# Patient Record
Sex: FEMALE | Race: BLACK OR AFRICAN AMERICAN | NOT HISPANIC OR LATINO | Employment: FULL TIME | URBAN - METROPOLITAN AREA
[De-identification: names, ages, dates, MRNs, and addresses within clinical notes are randomized per-mention and may not be internally consistent; named-entity substitution may affect disease eponyms.]

---

## 2018-04-06 LAB
HBV SURFACE AG SERPL QL IA: NEGATIVE
RUBELLA ANTIBODY IGG QUANTITATIVE: NORMAL IU/ML

## 2018-04-27 ENCOUNTER — HOSPITAL ENCOUNTER (OUTPATIENT)
Facility: CLINIC | Age: 33
End: 2018-04-27
Payer: COMMERCIAL

## 2018-07-18 LAB — HIV 1+2 AB+HIV1 P24 AG SERPL QL IA: NEGATIVE

## 2018-10-03 ENCOUNTER — HEALTH MAINTENANCE LETTER (OUTPATIENT)
Age: 33
End: 2018-10-03

## 2018-10-12 ENCOUNTER — HOSPITAL ENCOUNTER (INPATIENT)
Facility: CLINIC | Age: 33
LOS: 4 days | Discharge: HOME | End: 2018-10-16
Attending: FAMILY MEDICINE | Admitting: FAMILY MEDICINE
Payer: COMMERCIAL

## 2018-10-12 DIAGNOSIS — Z98.891 S/P CESAREAN SECTION: Primary | ICD-10-CM

## 2018-10-12 PROBLEM — O42.90 PREMATURE RUPTURE OF MEMBRANES: Status: ACTIVE | Noted: 2018-10-12

## 2018-10-12 PROBLEM — Z34.00 SUPERVISION OF NORMAL FIRST PREGNANCY: Status: ACTIVE | Noted: 2018-04-06

## 2018-10-12 PROBLEM — Z86.19 HISTORY OF HERPES GENITALIS: Status: ACTIVE | Noted: 2018-04-06

## 2018-10-12 PROBLEM — E55.9 VITAMIN D DEFICIENCY: Status: ACTIVE | Noted: 2018-04-12

## 2018-10-12 PROBLEM — O99.013 ANEMIA DURING PREGNANCY IN THIRD TRIMESTER: Status: ACTIVE | Noted: 2018-08-29

## 2018-10-12 LAB
HGB BLD-MCNC: 10.7 G/DL (ref 11.7–15.7)
PLATELET # BLD AUTO: 221 10E9/L (ref 150–450)
RUPTURE OF FETAL MEMBRANES BY ROM PLUS: POSITIVE

## 2018-10-12 PROCEDURE — 86900 BLOOD TYPING SEROLOGIC ABO: CPT | Performed by: FAMILY MEDICINE

## 2018-10-12 PROCEDURE — 86850 RBC ANTIBODY SCREEN: CPT | Performed by: MIDWIFE

## 2018-10-12 PROCEDURE — 40000268 ZZH STATISTIC NO CHARGES

## 2018-10-12 PROCEDURE — 86901 BLOOD TYPING SEROLOGIC RH(D): CPT | Performed by: FAMILY MEDICINE

## 2018-10-12 PROCEDURE — 86780 TREPONEMA PALLIDUM: CPT | Performed by: FAMILY MEDICINE

## 2018-10-12 PROCEDURE — 85018 HEMOGLOBIN: CPT | Performed by: FAMILY MEDICINE

## 2018-10-12 PROCEDURE — G0463 HOSPITAL OUTPT CLINIC VISIT: HCPCS

## 2018-10-12 PROCEDURE — 86901 BLOOD TYPING SEROLOGIC RH(D): CPT | Performed by: MIDWIFE

## 2018-10-12 PROCEDURE — 25000132 ZZH RX MED GY IP 250 OP 250 PS 637: Performed by: FAMILY MEDICINE

## 2018-10-12 PROCEDURE — 12000030 ZZH R&B OB INTERMEDIATE UMMC

## 2018-10-12 PROCEDURE — 86900 BLOOD TYPING SEROLOGIC ABO: CPT | Performed by: MIDWIFE

## 2018-10-12 PROCEDURE — 36415 COLL VENOUS BLD VENIPUNCTURE: CPT | Performed by: FAMILY MEDICINE

## 2018-10-12 PROCEDURE — 84112 EVAL AMNIOTIC FLUID PROTEIN: CPT | Performed by: FAMILY MEDICINE

## 2018-10-12 PROCEDURE — 85049 AUTOMATED PLATELET COUNT: CPT | Performed by: FAMILY MEDICINE

## 2018-10-12 RX ORDER — AMOXICILLIN 250 MG
1 CAPSULE ORAL DAILY
Status: ON HOLD | COMMUNITY
End: 2018-10-12

## 2018-10-12 RX ORDER — PRENATAL VIT/IRON FUM/FOLIC AC 27MG-0.8MG
1 TABLET ORAL DAILY
COMMUNITY

## 2018-10-12 RX ORDER — ACETAMINOPHEN 325 MG/1
650 TABLET ORAL EVERY 4 HOURS PRN
Status: DISCONTINUED | OUTPATIENT
Start: 2018-10-12 | End: 2018-10-13

## 2018-10-12 RX ORDER — METHYLERGONOVINE MALEATE 0.2 MG/ML
200 INJECTION INTRAVENOUS
Status: DISCONTINUED | OUTPATIENT
Start: 2018-10-12 | End: 2018-10-13

## 2018-10-12 RX ORDER — IBUPROFEN 800 MG/1
800 TABLET, FILM COATED ORAL
Status: DISCONTINUED | OUTPATIENT
Start: 2018-10-12 | End: 2018-10-13

## 2018-10-12 RX ORDER — NALOXONE HYDROCHLORIDE 0.4 MG/ML
.1-.4 INJECTION, SOLUTION INTRAMUSCULAR; INTRAVENOUS; SUBCUTANEOUS
Status: DISCONTINUED | OUTPATIENT
Start: 2018-10-12 | End: 2018-10-13

## 2018-10-12 RX ORDER — MORPHINE SULFATE 10 MG/ML
10 INJECTION, SOLUTION INTRAMUSCULAR; INTRAVENOUS
Status: COMPLETED | OUTPATIENT
Start: 2018-10-12 | End: 2018-10-13

## 2018-10-12 RX ORDER — ONDANSETRON 2 MG/ML
4 INJECTION INTRAMUSCULAR; INTRAVENOUS EVERY 6 HOURS PRN
Status: DISCONTINUED | OUTPATIENT
Start: 2018-10-12 | End: 2018-10-12

## 2018-10-12 RX ORDER — OXYCODONE AND ACETAMINOPHEN 5; 325 MG/1; MG/1
1 TABLET ORAL
Status: DISCONTINUED | OUTPATIENT
Start: 2018-10-12 | End: 2018-10-13

## 2018-10-12 RX ORDER — OXYTOCIN/0.9 % SODIUM CHLORIDE 30/500 ML
100-340 PLASTIC BAG, INJECTION (ML) INTRAVENOUS CONTINUOUS PRN
Status: COMPLETED | OUTPATIENT
Start: 2018-10-12 | End: 2018-10-13

## 2018-10-12 RX ORDER — OXYTOCIN 10 [USP'U]/ML
10 INJECTION, SOLUTION INTRAMUSCULAR; INTRAVENOUS
Status: DISCONTINUED | OUTPATIENT
Start: 2018-10-12 | End: 2018-10-13

## 2018-10-12 RX ORDER — MISOPROSTOL 100 UG/1
25 TABLET ORAL
Status: DISCONTINUED | OUTPATIENT
Start: 2018-10-12 | End: 2018-10-13

## 2018-10-12 RX ORDER — CARBOPROST TROMETHAMINE 250 UG/ML
250 INJECTION, SOLUTION INTRAMUSCULAR
Status: DISCONTINUED | OUTPATIENT
Start: 2018-10-12 | End: 2018-10-13

## 2018-10-12 RX ORDER — SODIUM CHLORIDE, SODIUM LACTATE, POTASSIUM CHLORIDE, CALCIUM CHLORIDE 600; 310; 30; 20 MG/100ML; MG/100ML; MG/100ML; MG/100ML
INJECTION, SOLUTION INTRAVENOUS CONTINUOUS
Status: DISCONTINUED | OUTPATIENT
Start: 2018-10-12 | End: 2018-10-13

## 2018-10-12 RX ORDER — FENTANYL CITRATE 50 UG/ML
50-100 INJECTION, SOLUTION INTRAMUSCULAR; INTRAVENOUS
Status: DISCONTINUED | OUTPATIENT
Start: 2018-10-12 | End: 2018-10-13

## 2018-10-12 RX ORDER — HYDROXYZINE HYDROCHLORIDE 50 MG/1
100 TABLET, FILM COATED ORAL
Status: COMPLETED | OUTPATIENT
Start: 2018-10-12 | End: 2018-10-13

## 2018-10-12 RX ORDER — LIDOCAINE 40 MG/G
CREAM TOPICAL
Status: DISCONTINUED | OUTPATIENT
Start: 2018-10-12 | End: 2018-10-13

## 2018-10-12 RX ORDER — ONDANSETRON 2 MG/ML
4 INJECTION INTRAMUSCULAR; INTRAVENOUS EVERY 6 HOURS PRN
Status: DISCONTINUED | OUTPATIENT
Start: 2018-10-12 | End: 2018-10-13

## 2018-10-12 RX ADMIN — Medication 25 MCG: at 22:56

## 2018-10-12 ASSESSMENT — ACTIVITIES OF DAILY LIVING (ADL)
AMBULATION: 0-->INDEPENDENT
RETIRED_EATING: 0-->INDEPENDENT
COGNITION: 0 - NO COGNITION ISSUES REPORTED
FALL_HISTORY_WITHIN_LAST_SIX_MONTHS: NO
TOILETING: 0-->INDEPENDENT
SWALLOWING: 0-->SWALLOWS FOODS/LIQUIDS WITHOUT DIFFICULTY
RETIRED_COMMUNICATION: 0-->UNDERSTANDS/COMMUNICATES WITHOUT DIFFICULTY
BATHING: 0-->INDEPENDENT
DRESS: 0-->INDEPENDENT
TRANSFERRING: 0-->INDEPENDENT

## 2018-10-12 NOTE — IP AVS SNAPSHOT
[facility]    [address] [address]    Phone:  [phone]                                       After Visit Summary   10/12/2018    Maya Pedro    MRN: [id number]           After Visit Summary Signature Page     I have received my discharge instructions, and my questions have been answered. I have discussed any challenges I see with this plan with the nurse or doctor.    ..........................................................................................................................................  Patient/Patient Representative Signature      ..........................................................................................................................................  Patient Representative Print Name and Relationship to Patient    ..................................................               ................................................  Date                                   Time    ..........................................................................................................................................  Reviewed by Signature/Title    ...................................................              ..............................................  Date                                               Time          22EPIC Rev 08/18

## 2018-10-12 NOTE — IP AVS SNAPSHOT
MRN:[id number]                      After Visit Summary   10/12/2018    Maya Pedro    MRN: [id number]           Thank you!     Thank you for choosing [facility] for your care. Our goal is always to provide you with excellent care. Hearing back from our patients is one way we can continue to improve our services. Please take a few minutes to complete the written survey that you may receive in the mail after you visit with us. Thank you!        Patient Information     Date Of Birth          1985        Designated Caregiver       Most Recent Value    Caregiver    Will someone help with your care after discharge? no    Name of designated caregiver Sergei      About your hospital stay     You were admitted on:  2018 You last received care in the:  [facility]    You were discharged on:  2018        Reason for your hospital stay       Maternity care                  Who to Call     For medical emergencies, please call 911.  For non-urgent questions about your medical care, please call your primary care provider or clinic, [phone]  For questions related to your surgery, please call your surgery clinic        Attending Provider     Provider Specialty    Subha Graham MD Family Practice    Patricia Hart [facility]N [facility]       Primary Care Provider Office Phone # Fax #    Sebastián SABILLON ANGELA Haider [phone]      After Care Instructions     Activity       Review discharge instructions            Diet       Resume previous diet            Discharge Instructions - Postpartum visit       Schedule postpartum visit with your provider and return to clinic in 1 week for BP check and in 6 weeks.    Postpartum Instructions    Post  Activity    Ask family and friends for help when you need it.  Do not place anything in your vagina for 6 weeks.  No intercourse for 6 weeks.   No lifting >15 lbs for 6 weeks  No  strenuous exercise for 6 weeks.   No driving until you have stopped taking your pain medications (usually two weeks after delivery).      Call your health care provider if you have any of these symptoms:    Increased pain, swelling, redness, or fluid around your stiches from an episiotomy or perineal tear.  A fever above 100.4 F (38 C) with or without chills when placing a thermometer under your tongue.  You soak a sanitary pad with blood within 1 hour, or you see blood clots larger than a golf ball.  Bleeding that lasts more than 6 weeks.  Vaginal discharge that smells bad.  Severe pain, cramping or tenderness in your lower belly area.  A need to urinate more frequently (use the toilet more often), more urgently (use the toilet very quickly), or it burns when you urinate.  Severe headaches or visual changes   Nausea and vomiting.  Redness, swelling or pain around a vein in your leg.  Problems breastfeeding or a red or painful area on your breast.  Chest pain and cough or are gasping for air.  Problems coping with sadness, anxiety, or depression.  If you have any concerns about hurting yourself or the baby, call your provider immediately.   You have questions or concerns after you return home.    Keep your hands clean:  Always wash your hands before touching your perineal area and stitches.  This helps reduce your risk of infection. Keep your nails clean and short.      Follow up in 1 week for BP check and in 6 weeks for post partum visit                  Follow-up Appointments     Follow Up and recommended labs and tests       Follow up with OB provider in 1 week for a BP check and 6 weeks for postpartum visit.                  Further instructions from your care team       Postop  Birth Instructions    Activity       Do not lift more than 10 pounds for 6 weeks after surgery.  Ask family and friends for help when you need it.    No driving until you have stopped taking your pain medications (usually two  weeks after surgery).    No heavy exercise or activity for 6 weeks.  Don't do anything that will put a strain on your surgery site.    Don't strain when using the toilet.  Your care team may prescribe a stool softener if you have problems with your bowel movements.     To care for your incision:       Keep the incision clean and dry.    Do not soak your incision in water. No swimming or hot tubs until it has fully healed. You may soak in the bathtub if the water level is below your incision.    Do not use peroxide, gel, cream, lotion, or ointment on your incision.    Adjust your clothes to avoid pressure on your surgery site (check the elastic in your underwear for example).     You may see a small amount of clear or pink drainage and this is normal.  Check with your health care provider:       If the drainage increases or has an odor.    If the incision reddens, you have swelling, or develop a rash.    If you have increased pain and the medicine we prescribed doesn't help.    If you have a fever above 100.4 F (38 C) with or without chills when placing thermometer under your tongue.   The area around your incision (surgery wound), will feel numb.  This is normal. The numbness should go away in less than a year.     Keep your hands clean:  Always wash your hands before touching your incision (surgery wound). This helps reduce your risk of infection. If your hands aren't dirty, you may use an alcohol hand-rub to clean your hands. Keep your nails clean and short.    Call your healthcare provider if you have any of these symptoms:       You soak a sanitary pad with blood within 1 hour, or you see blood clots larger than a golf ball.    Bleeding that lasts more than 6 weeks.    Vaginal discharge that smells bad.    Severe pain, cramping or tenderness in your lower belly area.    A need to urinate more frequently (use the toilet more often), more urgently (use the toilet very quickly), or it burns when you  "urinate.    Nausea and vomiting.    Redness, swelling or pain around a vein in your leg.    Problems breastfeeding or a red or painful area on your breast.    Chest pain and cough or are gasping for air.    Problems with coping with sadness, anxiety or depression. If you have concerns about hurting yourself or the baby, call your provider immediately.      You have questions or concerns after you return home.     Preeclampsia   Call your doctor right away if you have any of the following:  - Edema (swelling) in your face or hands  - Rapid weight gain-about 1 pound or more in a day  - Headache  - Abdominal pain on your right side  - Vision problems (flashes or spots)  - You have questions or concerns once you return home.               Pending Results     Date and Time Order Name Status Description    10/13/2018 2239 Placenta path order and indications In process             Admission Information     Date & Time Provider Department Dept. Phone    10/12/2018 Yolanda Lozano MD [facility] [phone]      Your Vitals Were     Blood Pressure Pulse Temperature Respirations Pulse Oximetry       124/80 71 98.2  F (36.8  C) (Oral) 16 100%       MyChart Information     [facility] lets you send messages to your doctor, view your test results, renew your prescriptions, schedule appointments and more. To sign up, go to [email or web address] . Click on \"Log in\" on the left side of the screen, which will take you to the Welcome page. Then click on \"Sign up Now\" on the right side of the page.     You will be asked to enter the access code listed below, as well as some personal information. Please follow the directions to create your username and password.     Your access code is: [email or web address]  Expires: 2019  1:54 PM     Your access code will  in 90 days. If you need help or a new code, please call your [facility] clinic or [phone].        Care EveryWhere ID     This is your Care EveryWhere ID. This could be used " by other organizations to access your [place] medical records  [id number]        Equal Access to Services     CJ MCCARTY : Diana Arcos, thor bruner, kell contrerasmathomas chaparro, jennifer dexter. So [facility] [phone].    ATENCIÓN: Si habla español, tiene a figueroa disposición servicios gratuitos de asistencia lingüística. Maiaame al [phone].    We comply with applicable federal civil rights laws and [place] laws. We do not discriminate on the basis of race, color, national origin, age, disability, sex, sexual orientation, or gender identity.               Review of your medicines      START taking        Dose / Directions    ferrous gluconate 324 (38 Fe) MG tablet   Commonly known as:  FERGON        Dose:  324 mg   Take 1 tablet (324 mg) by mouth daily (with breakfast)   Quantity:  60 tablet   Refills:  0       ibuprofen 800 MG tablet   Commonly known as:  ADVIL/MOTRIN        Dose:  800 mg   Take 1 tablet (800 mg) by mouth every 6 hours as needed for other (cramping)   Quantity:  40 tablet   Refills:  0       oxyCODONE IR 5 MG tablet   Commonly known as:  ROXICODONE        Dose:  5 mg   Take 1 tablet (5 mg) by mouth every 6 hours as needed for severe pain   Quantity:  10 tablet   Refills:  0       senna-docusate 8.6-50 MG per tablet   Commonly known as:  SENOKOT-S;PERICOLACE        Dose:  1 tablet   Take 1 tablet by mouth 2 times daily as needed for constipation   Quantity:  40 tablet   Refills:  0         CONTINUE these medicines which have NOT CHANGED        Dose / Directions    ACYCLOVIR PO        Dose:  1 g   Take 1 g by mouth daily   Refills:  0       COLACE PO        Dose:  100 mg   Take 100 mg by mouth   Refills:  0       IRON SUPPLEMENT PO        Dose:  325 mg   Take 325 mg by mouth daily   Refills:  0       prenatal multivitamin plus iron 27-0.8 MG Tabs per tablet   Indication:  Pregnancy        Dose:  1 tablet   Take 1 tablet by mouth daily   Refills:  0     "   VITAMIN D (CHOLECALCIFEROL) PO        Take by mouth daily   Refills:  0            Where to get your medicines      These medications were sent to [facility] [place], [place] - [address]  [address] [facility] [address], [facility] [address]     Phone:  [phone]     ferrous gluconate 324 (38 Fe) MG tablet    ibuprofen 800 MG tablet    senna-docusate 8.6-50 MG per tablet         Some of these will need a paper prescription and others can be bought over the counter. Ask your nurse if you have questions.     Bring a paper prescription for each of these medications     oxyCODONE IR 5 MG tablet                Protect others around you: Learn how to safely use, store and throw away your medicines at www.disposemymeds.org.        Information about your nerve block     Today you received a block to numb the nerves near your surgery site.    This is a block using local anesthetic or \"numbing\" medication injected around the nerves to anesthetize or \"numb\" the area supplied by those nerves. This block is injected into the muscle layer near your surgical site. The type of anesthesia (Exparel) your anesthesia team used to numb your abdomen may give you relief for up to 72 hours.     Diet: There are no diet restrictions, but you should drink plenty of fluids, unless you are on a fluid-restricted diet.     Activity: If your surgical site is an arm or leg you should be careful with your affected limb, since it is possible to injure your limb without being aware of it due to the numbing. Until full feeling returns, you should guard against bumping or hitting your limb, and avoid extreme hot or cold temperatures on the skin.    Pain Medication: As the block wears off, the feeling will return as a tingling or prickly sensation near your surgical site. You will experience more discomfort from your incisions as the feeling returns. You may want to take a pain pill (a narcotic or Tylenol if this was prescribed by " your surgeon) when you start to experience mild pain, before the pain becomes more severe. If your pain medications do not control your pain, you should notify your surgeon. If you are taking narcotics for pain management, do not drink alcohol, drive a car, or perform hazardous activities.  If you have questions or concerns you may call your surgeon at the number provided with your discharge instructions.     Call your surgeon if you experience blurry vision, ringing in the ears or metallic taste in your mouth.         ANTIBIOTIC INSTRUCTION     You've Been Prescribed an Antibiotic - Now What?  Your healthcare team thinks that you or your loved one might have an infection. Some infections can be treated with antibiotics, which are powerful, life-saving drugs. Like all medications, antibiotics have side effects and should only be used when necessary. There are some important things you should know about your antibiotic treatment.      Your healthcare team may run tests before you start taking an antibiotic.    Your team may take samples (e.g., from your blood, urine or other areas) to run tests to look for bacteria. These test can be important to determine if you need an antibiotic at all and, if you do, which antibiotic will work best.      Within a few days, your healthcare team might change or even stop your antibiotic.    Your team may start you on an antibiotic while they are working to find out what is making you sick.    Your team might change your antibiotic because test results show that a different antibiotic would be better to treat your infection.    In some cases, once your team has more information, they learn that you do not need an antibiotic at all. They may find out that you don't have an infection, or that the antibiotic you're taking won't work against your infection. For example, an infection caused by a virus can't be treated with antibiotics. Staying on an antibiotic when you don't need it is  more likely to be harmful than helpful.      You may experience side effects from your antibiotic.    Like all medications, antibiotics have side effects. Some of these can be serious.    Let you healthcare team know if you have any known allergies when you are admitted to the hospital.    One significant side effect of nearly all antibiotics is the risk of severe and sometimes deadly diarrhea caused by Clostridium difficile (C. Difficile). This occurs when a person takes antibiotics because some good germs are destroyed. Antibiotic use allows C. diificile to take over, putting patients at high risk for this serious infection.    As a patient or caregiver, it is important to understand your or your loved one's antibiotic treatment. It is especially important for caregivers to speak up when patients can't speak for themselves. Here are some important questions to ask your healthcare team.    What infection is this antibiotic treating and how do you know I have that infection?    What side effects might occur from this antibiotic?    How long will I need to take this antibiotic?    Is it safe to take this antibiotic with other medications or supplements (e.g., vitamins) that I am taking?     Are there any special directions I need to know about taking this antibiotic? For example, should I take it with food?    How will I be monitored to know whether my infection is responding to the antibiotic?    What tests may help to make sure the right antibiotic is prescribed for me?      Information provided by:  www.cdc.gov/getsmart  U.S. Department of Health and Human Services  Centers for disease Control and Prevention  National Center for Emerging and Zoonotic Infectious Diseases  Division of Healthcare Quality Promotion        Information about OPIOIDS     PRESCRIPTION OPIOIDS: WHAT YOU NEED TO KNOW   We gave you an opioid (narcotic) pain medicine. It is important to manage your pain, but opioids are not always the best  choice. You should first try all the other options your care team gave you. Take this medicine for as short a time (and as few doses) as possible.    Some activities can increase your pain, such as bandage changes or therapy sessions. It may help to take your pain medicine 30 to 60 minutes before these activities. Reduce your stress by getting enough sleep, working on hobbies you enjoy and practicing relaxation or meditation. Talk to your care team about ways to manage your pain beyond prescription opioids.    These medicines have risks:    DO NOT drive when on new or higher doses of pain medicine. These medicines can affect your alertness and reaction times, and you could be arrested for driving under the influence (DUI). If you need to use opioids long-term, talk to your care team about driving.    DO NOT operate heavy machinery    DO NOT do any other dangerous activities while taking these medicines.    DO NOT drink any alcohol while taking these medicines.     If the opioid prescribed includes acetaminophen, DO NOT take with any other medicines that contain acetaminophen. Read all labels carefully. Look for the word  acetaminophen  or  Tylenol.  Ask your pharmacist if you have questions or are unsure.    You can get addicted to pain medicines, especially if you have a history of addiction (chemical, alcohol or substance dependence). Talk to your care team about ways to reduce this risk.    All opioids tend to cause constipation. Drink plenty of water and eat foods that have a lot of fiber, such as fruits, vegetables, prune juice, apple juice and high-fiber cereal. Take a laxative (Miralax, milk of magnesia, Colace, Senna) if you don t move your bowels at least every other day. Other side effects include upset stomach, sleepiness, dizziness, throwing up, tolerance (needing more of the medicine to have the same effect), physical dependence and slowed breathing.    Store your pills in a secure place, locked if  possible. We will not replace any lost or stolen medicine. If you don t finish your medicine, please throw away (dispose) as directed by your pharmacist. The [place] Pollution Control Agency has more information about safe disposal: https://www.pca.state.[place].us/living-green/managing-unwanted-medications             Medication List: This is a list of all your medications and when to take them. Check marks below indicate your daily home schedule. Keep this list as a reference.      Medications           Morning Afternoon Evening Bedtime As Needed    ACYCLOVIR PO   Take 1 g by mouth daily   Last time this was given:  400 mg on 10/13/2018  4:58 PM                                COLACE PO   Take 100 mg by mouth                                ferrous gluconate 324 (38 Fe) MG tablet   Commonly known as:  FERGON   Take 1 tablet (324 mg) by mouth daily (with breakfast)                                ibuprofen 800 MG tablet   Commonly known as:  ADVIL/MOTRIN   Take 1 tablet (800 mg) by mouth every 6 hours as needed for other (cramping)   Last time this was given:  800 mg on 10/16/2018 12:06 PM                                IRON SUPPLEMENT PO   Take 325 mg by mouth daily                                oxyCODONE IR 5 MG tablet   Commonly known as:  ROXICODONE   Take 1 tablet (5 mg) by mouth every 6 hours as needed for severe pain                                prenatal multivitamin plus iron 27-0.8 MG Tabs per tablet   Take 1 tablet by mouth daily                                senna-docusate 8.6-50 MG per tablet   Commonly known as:  SENOKOT-S;PERICOLACE   Take 1 tablet by mouth 2 times daily as needed for constipation   Last time this was given:  1 tablet on 10/15/2018  7:20 PM                                VITAMIN D (CHOLECALCIFEROL) PO   Take by mouth daily

## 2018-10-13 ENCOUNTER — ANESTHESIA EVENT (OUTPATIENT)
Dept: OBGYN | Facility: CLINIC | Age: 33
End: 2018-10-13
Payer: COMMERCIAL

## 2018-10-13 ENCOUNTER — SURGERY (OUTPATIENT)
Age: 33
End: 2018-10-13

## 2018-10-13 ENCOUNTER — ANESTHESIA (OUTPATIENT)
Dept: OBGYN | Facility: CLINIC | Age: 33
End: 2018-10-13
Payer: COMMERCIAL

## 2018-10-13 LAB
ABO + RH BLD: NORMAL
ALT SERPL W P-5'-P-CCNC: 20 U/L (ref 0–50)
AST SERPL W P-5'-P-CCNC: 17 U/L (ref 0–45)
BLD GP AB SCN SERPL QL: NORMAL
BLOOD BANK CMNT PATIENT-IMP: NORMAL
CREAT SERPL-MCNC: 0.68 MG/DL (ref 0.52–1.04)
ERYTHROCYTE [DISTWIDTH] IN BLOOD BY AUTOMATED COUNT: 14.2 % (ref 10–15)
GFR SERPL CREATININE-BSD FRML MDRD: >90 ML/MIN/1.7M2
HCT VFR BLD AUTO: 32.1 % (ref 35–47)
HGB BLD-MCNC: 10.4 G/DL (ref 11.7–15.7)
MCH RBC QN AUTO: 27.7 PG (ref 26.5–33)
MCHC RBC AUTO-ENTMCNC: 32.4 G/DL (ref 31.5–36.5)
MCV RBC AUTO: 86 FL (ref 78–100)
PLATELET # BLD AUTO: 216 10E9/L (ref 150–450)
RBC # BLD AUTO: 3.75 10E12/L (ref 3.8–5.2)
SPECIMEN EXP DATE BLD: NORMAL
SPECIMEN EXP DATE BLD: NORMAL
T PALLIDUM AB SER QL: NONREACTIVE
WBC # BLD AUTO: 20.8 10E9/L (ref 4–11)

## 2018-10-13 PROCEDURE — 27110028 ZZH OR GENERAL SUPPLY NON-STERILE: Performed by: OBSTETRICS & GYNECOLOGY

## 2018-10-13 PROCEDURE — 25000132 ZZH RX MED GY IP 250 OP 250 PS 637: Performed by: FAMILY MEDICINE

## 2018-10-13 PROCEDURE — 82565 ASSAY OF CREATININE: CPT | Performed by: STUDENT IN AN ORGANIZED HEALTH CARE EDUCATION/TRAINING PROGRAM

## 2018-10-13 PROCEDURE — 36000057 ZZH SURGERY LEVEL 3 1ST 30 MIN - UMMC: Performed by: OBSTETRICS & GYNECOLOGY

## 2018-10-13 PROCEDURE — 37000009 ZZH ANESTHESIA TECHNICAL FEE, EACH ADDTL 15 MIN: Performed by: OBSTETRICS & GYNECOLOGY

## 2018-10-13 PROCEDURE — 25000125 ZZHC RX 250: Performed by: FAMILY MEDICINE

## 2018-10-13 PROCEDURE — 25000128 H RX IP 250 OP 636: Performed by: STUDENT IN AN ORGANIZED HEALTH CARE EDUCATION/TRAINING PROGRAM

## 2018-10-13 PROCEDURE — 25000125 ZZHC RX 250: Performed by: ANESTHESIOLOGY

## 2018-10-13 PROCEDURE — 85027 COMPLETE CBC AUTOMATED: CPT | Performed by: STUDENT IN AN ORGANIZED HEALTH CARE EDUCATION/TRAINING PROGRAM

## 2018-10-13 PROCEDURE — 88307 TISSUE EXAM BY PATHOLOGIST: CPT | Mod: 26 | Performed by: STUDENT IN AN ORGANIZED HEALTH CARE EDUCATION/TRAINING PROGRAM

## 2018-10-13 PROCEDURE — 25000125 ZZHC RX 250: Performed by: NURSE ANESTHETIST, CERTIFIED REGISTERED

## 2018-10-13 PROCEDURE — 12000032 ZZH R&B OB CRITICAL UMMC

## 2018-10-13 PROCEDURE — 71000014 ZZH RECOVERY PHASE 1 LEVEL 2 FIRST HR: Performed by: OBSTETRICS & GYNECOLOGY

## 2018-10-13 PROCEDURE — 27210794 ZZH OR GENERAL SUPPLY STERILE: Performed by: OBSTETRICS & GYNECOLOGY

## 2018-10-13 PROCEDURE — 25000125 ZZHC RX 250

## 2018-10-13 PROCEDURE — 25000132 ZZH RX MED GY IP 250 OP 250 PS 637

## 2018-10-13 PROCEDURE — 25000125 ZZHC RX 250: Performed by: STUDENT IN AN ORGANIZED HEALTH CARE EDUCATION/TRAINING PROGRAM

## 2018-10-13 PROCEDURE — 25000128 H RX IP 250 OP 636

## 2018-10-13 PROCEDURE — 25000128 H RX IP 250 OP 636: Performed by: MIDWIFE

## 2018-10-13 PROCEDURE — 71000015 ZZH RECOVERY PHASE 1 LEVEL 2 EA ADDTL HR: Performed by: OBSTETRICS & GYNECOLOGY

## 2018-10-13 PROCEDURE — 37000008 ZZH ANESTHESIA TECHNICAL FEE, 1ST 30 MIN: Performed by: OBSTETRICS & GYNECOLOGY

## 2018-10-13 PROCEDURE — 25000128 H RX IP 250 OP 636: Performed by: FAMILY MEDICINE

## 2018-10-13 PROCEDURE — 36000059 ZZH SURGERY LEVEL 3 EA 15 ADDTL MIN UMMC: Performed by: OBSTETRICS & GYNECOLOGY

## 2018-10-13 PROCEDURE — 84450 TRANSFERASE (AST) (SGOT): CPT | Performed by: STUDENT IN AN ORGANIZED HEALTH CARE EDUCATION/TRAINING PROGRAM

## 2018-10-13 PROCEDURE — 88307 TISSUE EXAM BY PATHOLOGIST: CPT | Performed by: STUDENT IN AN ORGANIZED HEALTH CARE EDUCATION/TRAINING PROGRAM

## 2018-10-13 PROCEDURE — 25000128 H RX IP 250 OP 636: Performed by: NURSE ANESTHETIST, CERTIFIED REGISTERED

## 2018-10-13 PROCEDURE — 40000169 ZZH STATISTIC PRE-PROCEDURE ASSESSMENT I: Performed by: OBSTETRICS & GYNECOLOGY

## 2018-10-13 PROCEDURE — 36415 COLL VENOUS BLD VENIPUNCTURE: CPT | Performed by: STUDENT IN AN ORGANIZED HEALTH CARE EDUCATION/TRAINING PROGRAM

## 2018-10-13 PROCEDURE — 25000132 ZZH RX MED GY IP 250 OP 250 PS 637: Performed by: MIDWIFE

## 2018-10-13 PROCEDURE — 40000977 ZZH STATISTIC ATTENDANCE AT DELIVERY

## 2018-10-13 PROCEDURE — 3E0P7VZ INTRODUCTION OF HORMONE INTO FEMALE REPRODUCTIVE, VIA NATURAL OR ARTIFICIAL OPENING: ICD-10-PCS | Performed by: MIDWIFE

## 2018-10-13 PROCEDURE — 84460 ALANINE AMINO (ALT) (SGPT): CPT | Performed by: STUDENT IN AN ORGANIZED HEALTH CARE EDUCATION/TRAINING PROGRAM

## 2018-10-13 PROCEDURE — C9290 INJ, BUPIVACAINE LIPOSOME: HCPCS | Performed by: STUDENT IN AN ORGANIZED HEALTH CARE EDUCATION/TRAINING PROGRAM

## 2018-10-13 RX ORDER — OXYTOCIN 10 [USP'U]/ML
10 INJECTION, SOLUTION INTRAMUSCULAR; INTRAVENOUS
Status: DISCONTINUED | OUTPATIENT
Start: 2018-10-13 | End: 2018-10-16 | Stop reason: HOSPADM

## 2018-10-13 RX ORDER — NALOXONE HYDROCHLORIDE 0.4 MG/ML
.1-.4 INJECTION, SOLUTION INTRAMUSCULAR; INTRAVENOUS; SUBCUTANEOUS
Status: DISCONTINUED | OUTPATIENT
Start: 2018-10-13 | End: 2018-10-16 | Stop reason: HOSPADM

## 2018-10-13 RX ORDER — EPHEDRINE SULFATE 50 MG/ML
INJECTION, SOLUTION INTRAMUSCULAR; INTRAVENOUS; SUBCUTANEOUS
Status: COMPLETED
Start: 2018-10-13 | End: 2018-10-13

## 2018-10-13 RX ORDER — PENICILLIN G POTASSIUM 5000000 [IU]/1
5 INJECTION, POWDER, FOR SOLUTION INTRAMUSCULAR; INTRAVENOUS ONCE
Status: COMPLETED | OUTPATIENT
Start: 2018-10-13 | End: 2018-10-13

## 2018-10-13 RX ORDER — LIDOCAINE HYDROCHLORIDE AND EPINEPHRINE 15; 5 MG/ML; UG/ML
INJECTION, SOLUTION EPIDURAL PRN
Status: DISCONTINUED | OUTPATIENT
Start: 2018-10-13 | End: 2018-10-13

## 2018-10-13 RX ORDER — OXYCODONE HYDROCHLORIDE 5 MG/1
5 TABLET ORAL EVERY 4 HOURS PRN
Status: DISCONTINUED | OUTPATIENT
Start: 2018-10-13 | End: 2018-10-16 | Stop reason: HOSPADM

## 2018-10-13 RX ORDER — ACETAMINOPHEN 325 MG/1
975 TABLET ORAL EVERY 8 HOURS
Status: DISCONTINUED | OUTPATIENT
Start: 2018-10-13 | End: 2018-10-16 | Stop reason: HOSPADM

## 2018-10-13 RX ORDER — DEXAMETHASONE SODIUM PHOSPHATE 4 MG/ML
INJECTION, SOLUTION INTRA-ARTICULAR; INTRALESIONAL; INTRAMUSCULAR; INTRAVENOUS; SOFT TISSUE PRN
Status: DISCONTINUED | OUTPATIENT
Start: 2018-10-13 | End: 2018-10-13

## 2018-10-13 RX ORDER — OXYTOCIN/0.9 % SODIUM CHLORIDE 30/500 ML
340 PLASTIC BAG, INJECTION (ML) INTRAVENOUS CONTINUOUS PRN
Status: DISCONTINUED | OUTPATIENT
Start: 2018-10-13 | End: 2018-10-16 | Stop reason: HOSPADM

## 2018-10-13 RX ORDER — CEFAZOLIN SODIUM 2 G/100ML
INJECTION, SOLUTION INTRAVENOUS
Status: DISCONTINUED
Start: 2018-10-13 | End: 2018-10-13 | Stop reason: HOSPADM

## 2018-10-13 RX ORDER — OXYTOCIN/0.9 % SODIUM CHLORIDE 30/500 ML
PLASTIC BAG, INJECTION (ML) INTRAVENOUS
Status: DISCONTINUED
Start: 2018-10-13 | End: 2018-10-13 | Stop reason: HOSPADM

## 2018-10-13 RX ORDER — ONDANSETRON 4 MG/1
4 TABLET, ORALLY DISINTEGRATING ORAL EVERY 6 HOURS PRN
Status: DISCONTINUED | OUTPATIENT
Start: 2018-10-13 | End: 2018-10-13

## 2018-10-13 RX ORDER — ONDANSETRON 2 MG/ML
4 INJECTION INTRAMUSCULAR; INTRAVENOUS EVERY 30 MIN PRN
Status: DISCONTINUED | OUTPATIENT
Start: 2018-10-13 | End: 2018-10-16 | Stop reason: HOSPADM

## 2018-10-13 RX ORDER — SODIUM CHLORIDE, SODIUM LACTATE, POTASSIUM CHLORIDE, CALCIUM CHLORIDE 600; 310; 30; 20 MG/100ML; MG/100ML; MG/100ML; MG/100ML
INJECTION, SOLUTION INTRAVENOUS CONTINUOUS PRN
Status: DISCONTINUED | OUTPATIENT
Start: 2018-10-13 | End: 2018-10-13

## 2018-10-13 RX ORDER — DEXTROSE, SODIUM CHLORIDE, SODIUM LACTATE, POTASSIUM CHLORIDE, AND CALCIUM CHLORIDE 5; .6; .31; .03; .02 G/100ML; G/100ML; G/100ML; G/100ML; G/100ML
INJECTION, SOLUTION INTRAVENOUS CONTINUOUS
Status: DISCONTINUED | OUTPATIENT
Start: 2018-10-13 | End: 2018-10-16 | Stop reason: HOSPADM

## 2018-10-13 RX ORDER — LIDOCAINE 40 MG/G
CREAM TOPICAL
Status: DISCONTINUED | OUTPATIENT
Start: 2018-10-13 | End: 2018-10-16 | Stop reason: HOSPADM

## 2018-10-13 RX ORDER — SIMETHICONE 80 MG
80 TABLET,CHEWABLE ORAL 4 TIMES DAILY PRN
Status: DISCONTINUED | OUTPATIENT
Start: 2018-10-13 | End: 2018-10-16 | Stop reason: HOSPADM

## 2018-10-13 RX ORDER — ACYCLOVIR 400 MG/1
400 TABLET ORAL 3 TIMES DAILY
Status: DISCONTINUED | OUTPATIENT
Start: 2018-10-13 | End: 2018-10-13

## 2018-10-13 RX ORDER — CITRIC ACID/SODIUM CITRATE 334-500MG
30 SOLUTION, ORAL ORAL
Status: DISCONTINUED | OUTPATIENT
Start: 2018-10-13 | End: 2018-10-16 | Stop reason: HOSPADM

## 2018-10-13 RX ORDER — NALOXONE HYDROCHLORIDE 0.4 MG/ML
.1-.4 INJECTION, SOLUTION INTRAMUSCULAR; INTRAVENOUS; SUBCUTANEOUS
Status: ACTIVE | OUTPATIENT
Start: 2018-10-13 | End: 2018-10-14

## 2018-10-13 RX ORDER — MORPHINE SULFATE 1 MG/ML
INJECTION, SOLUTION EPIDURAL; INTRATHECAL; INTRAVENOUS PRN
Status: DISCONTINUED | OUTPATIENT
Start: 2018-10-13 | End: 2018-10-13

## 2018-10-13 RX ORDER — KETOROLAC TROMETHAMINE 30 MG/ML
30 INJECTION, SOLUTION INTRAMUSCULAR; INTRAVENOUS EVERY 6 HOURS
Status: COMPLETED | OUTPATIENT
Start: 2018-10-14 | End: 2018-10-14

## 2018-10-13 RX ORDER — ONDANSETRON 4 MG/1
4 TABLET, ORALLY DISINTEGRATING ORAL EVERY 30 MIN PRN
Status: DISCONTINUED | OUTPATIENT
Start: 2018-10-13 | End: 2018-10-16 | Stop reason: HOSPADM

## 2018-10-13 RX ORDER — CEFAZOLIN SODIUM 1 G/3ML
INJECTION, POWDER, FOR SOLUTION INTRAMUSCULAR; INTRAVENOUS PRN
Status: DISCONTINUED | OUTPATIENT
Start: 2018-10-13 | End: 2018-10-13

## 2018-10-13 RX ORDER — ONDANSETRON 2 MG/ML
4 INJECTION INTRAMUSCULAR; INTRAVENOUS EVERY 6 HOURS PRN
Status: DISCONTINUED | OUTPATIENT
Start: 2018-10-13 | End: 2018-10-16 | Stop reason: HOSPADM

## 2018-10-13 RX ORDER — METHYLERGONOVINE MALEATE 0.2 MG/ML
200 INJECTION INTRAVENOUS
Status: DISCONTINUED | OUTPATIENT
Start: 2018-10-13 | End: 2018-10-16 | Stop reason: HOSPADM

## 2018-10-13 RX ORDER — EPHEDRINE SULFATE 50 MG/ML
5 INJECTION, SOLUTION INTRAMUSCULAR; INTRAVENOUS; SUBCUTANEOUS
Status: DISCONTINUED | OUTPATIENT
Start: 2018-10-13 | End: 2018-10-13

## 2018-10-13 RX ORDER — HYDROMORPHONE HYDROCHLORIDE 1 MG/ML
.3-.5 INJECTION, SOLUTION INTRAMUSCULAR; INTRAVENOUS; SUBCUTANEOUS EVERY 30 MIN PRN
Status: DISCONTINUED | OUTPATIENT
Start: 2018-10-13 | End: 2018-10-16 | Stop reason: HOSPADM

## 2018-10-13 RX ORDER — ACETAMINOPHEN 325 MG/1
650 TABLET ORAL EVERY 4 HOURS PRN
Status: DISCONTINUED | OUTPATIENT
Start: 2018-10-16 | End: 2018-10-16 | Stop reason: HOSPADM

## 2018-10-13 RX ORDER — SODIUM CHLORIDE, SODIUM LACTATE, POTASSIUM CHLORIDE, CALCIUM CHLORIDE 600; 310; 30; 20 MG/100ML; MG/100ML; MG/100ML; MG/100ML
INJECTION, SOLUTION INTRAVENOUS CONTINUOUS
Status: DISCONTINUED | OUTPATIENT
Start: 2018-10-13 | End: 2018-10-16 | Stop reason: HOSPADM

## 2018-10-13 RX ORDER — IBUPROFEN 800 MG/1
800 TABLET, FILM COATED ORAL EVERY 6 HOURS PRN
Status: DISCONTINUED | OUTPATIENT
Start: 2018-10-13 | End: 2018-10-16 | Stop reason: HOSPADM

## 2018-10-13 RX ORDER — OXYTOCIN/0.9 % SODIUM CHLORIDE 30/500 ML
100 PLASTIC BAG, INJECTION (ML) INTRAVENOUS CONTINUOUS
Status: DISCONTINUED | OUTPATIENT
Start: 2018-10-13 | End: 2018-10-16 | Stop reason: HOSPADM

## 2018-10-13 RX ORDER — LIDOCAINE HCL/EPINEPHRINE/PF 2%-1:200K
VIAL (ML) INJECTION PRN
Status: DISCONTINUED | OUTPATIENT
Start: 2018-10-13 | End: 2018-10-13

## 2018-10-13 RX ORDER — MORPHINE SULFATE 1 MG/ML
INJECTION, SOLUTION EPIDURAL; INTRATHECAL; INTRAVENOUS
Status: DISCONTINUED
Start: 2018-10-13 | End: 2018-10-13 | Stop reason: HOSPADM

## 2018-10-13 RX ORDER — ACETAMINOPHEN 325 MG/1
650 TABLET ORAL EVERY 4 HOURS PRN
Status: DISCONTINUED | OUTPATIENT
Start: 2018-10-13 | End: 2018-10-16 | Stop reason: HOSPADM

## 2018-10-13 RX ORDER — ONDANSETRON 2 MG/ML
INJECTION INTRAMUSCULAR; INTRAVENOUS PRN
Status: DISCONTINUED | OUTPATIENT
Start: 2018-10-13 | End: 2018-10-13

## 2018-10-13 RX ORDER — BUPIVACAINE HYDROCHLORIDE AND EPINEPHRINE 2.5; 5 MG/ML; UG/ML
INJECTION, SOLUTION INFILTRATION; PERINEURAL PRN
Status: DISCONTINUED | OUTPATIENT
Start: 2018-10-13 | End: 2018-10-13

## 2018-10-13 RX ORDER — FENTANYL CITRATE 50 UG/ML
25-50 INJECTION, SOLUTION INTRAMUSCULAR; INTRAVENOUS EVERY 5 MIN PRN
Status: DISCONTINUED | OUTPATIENT
Start: 2018-10-13 | End: 2018-10-16 | Stop reason: HOSPADM

## 2018-10-13 RX ORDER — OXYCODONE HYDROCHLORIDE 5 MG/1
5-10 TABLET ORAL
Status: DISCONTINUED | OUTPATIENT
Start: 2018-10-13 | End: 2018-10-16 | Stop reason: HOSPADM

## 2018-10-13 RX ORDER — LANOLIN 100 %
OINTMENT (GRAM) TOPICAL
Status: DISCONTINUED | OUTPATIENT
Start: 2018-10-13 | End: 2018-10-16 | Stop reason: HOSPADM

## 2018-10-13 RX ORDER — CITRIC ACID/SODIUM CITRATE 334-500MG
SOLUTION, ORAL ORAL
Status: COMPLETED
Start: 2018-10-13 | End: 2018-10-13

## 2018-10-13 RX ORDER — CEFAZOLIN SODIUM 2 G/100ML
2 INJECTION, SOLUTION INTRAVENOUS
Status: DISCONTINUED | OUTPATIENT
Start: 2018-10-13 | End: 2018-10-13

## 2018-10-13 RX ORDER — CEFAZOLIN SODIUM 1 G/3ML
1 INJECTION, POWDER, FOR SOLUTION INTRAMUSCULAR; INTRAVENOUS SEE ADMIN INSTRUCTIONS
Status: DISCONTINUED | OUTPATIENT
Start: 2018-10-13 | End: 2018-10-13

## 2018-10-13 RX ORDER — MISOPROSTOL 200 UG/1
TABLET ORAL
Status: DISCONTINUED
Start: 2018-10-13 | End: 2018-10-13 | Stop reason: HOSPADM

## 2018-10-13 RX ORDER — FENTANYL/BUPIVACAINE/NS/PF 2-1250MCG
PLASTIC BAG, INJECTION (ML) INJECTION
Status: COMPLETED
Start: 2018-10-13 | End: 2018-10-13

## 2018-10-13 RX ORDER — AMOXICILLIN 250 MG
1 CAPSULE ORAL 2 TIMES DAILY PRN
Status: DISCONTINUED | OUTPATIENT
Start: 2018-10-13 | End: 2018-10-16 | Stop reason: HOSPADM

## 2018-10-13 RX ORDER — CARBOPROST TROMETHAMINE 250 UG/ML
250 INJECTION, SOLUTION INTRAMUSCULAR
Status: DISCONTINUED | OUTPATIENT
Start: 2018-10-13 | End: 2018-10-16 | Stop reason: HOSPADM

## 2018-10-13 RX ORDER — BISACODYL 10 MG
10 SUPPOSITORY, RECTAL RECTAL DAILY PRN
Status: DISCONTINUED | OUTPATIENT
Start: 2018-10-15 | End: 2018-10-16 | Stop reason: HOSPADM

## 2018-10-13 RX ORDER — HYDROCORTISONE 2.5 %
CREAM (GRAM) TOPICAL 3 TIMES DAILY PRN
Status: DISCONTINUED | OUTPATIENT
Start: 2018-10-13 | End: 2018-10-16 | Stop reason: HOSPADM

## 2018-10-13 RX ORDER — FENTANYL CITRATE 50 UG/ML
INJECTION, SOLUTION INTRAMUSCULAR; INTRAVENOUS PRN
Status: DISCONTINUED | OUTPATIENT
Start: 2018-10-13 | End: 2018-10-13

## 2018-10-13 RX ORDER — MISOPROSTOL 200 UG/1
400 TABLET ORAL
Status: DISCONTINUED | OUTPATIENT
Start: 2018-10-13 | End: 2018-10-16 | Stop reason: HOSPADM

## 2018-10-13 RX ORDER — NALBUPHINE HYDROCHLORIDE 10 MG/ML
2.5-5 INJECTION, SOLUTION INTRAMUSCULAR; INTRAVENOUS; SUBCUTANEOUS EVERY 6 HOURS PRN
Status: DISCONTINUED | OUTPATIENT
Start: 2018-10-13 | End: 2018-10-13

## 2018-10-13 RX ORDER — ONDANSETRON 2 MG/ML
4 INJECTION INTRAMUSCULAR; INTRAVENOUS EVERY 6 HOURS PRN
Status: DISCONTINUED | OUTPATIENT
Start: 2018-10-13 | End: 2018-10-13

## 2018-10-13 RX ORDER — AMOXICILLIN 250 MG
2 CAPSULE ORAL 2 TIMES DAILY PRN
Status: DISCONTINUED | OUTPATIENT
Start: 2018-10-13 | End: 2018-10-16 | Stop reason: HOSPADM

## 2018-10-13 RX ORDER — NALOXONE HYDROCHLORIDE 0.4 MG/ML
.1-.4 INJECTION, SOLUTION INTRAMUSCULAR; INTRAVENOUS; SUBCUTANEOUS
Status: DISCONTINUED | OUTPATIENT
Start: 2018-10-13 | End: 2018-10-13

## 2018-10-13 RX ORDER — MISOPROSTOL 100 UG/1
25 TABLET ORAL
Status: DISCONTINUED | OUTPATIENT
Start: 2018-10-13 | End: 2018-10-13

## 2018-10-13 RX ADMIN — Medication 25 MCG: at 01:10

## 2018-10-13 RX ADMIN — Medication: at 12:05

## 2018-10-13 RX ADMIN — LIDOCAINE HYDROCHLORIDE,EPINEPHRINE BITARTRATE 3 ML: 20; .005 INJECTION, SOLUTION EPIDURAL; INFILTRATION; INTRACAUDAL; PERINEURAL at 18:10

## 2018-10-13 RX ADMIN — FENTANYL CITRATE 16 MCG: 50 INJECTION, SOLUTION INTRAMUSCULAR; INTRAVENOUS at 12:50

## 2018-10-13 RX ADMIN — SODIUM CHLORIDE, POTASSIUM CHLORIDE, SODIUM LACTATE AND CALCIUM CHLORIDE: 600; 310; 30; 20 INJECTION, SOLUTION INTRAVENOUS at 18:12

## 2018-10-13 RX ADMIN — BUPIVACAINE 20 ML: 13.3 INJECTION, SUSPENSION, LIPOSOMAL INFILTRATION at 19:36

## 2018-10-13 RX ADMIN — LIDOCAINE HYDROCHLORIDE,EPINEPHRINE BITARTRATE 5 ML: 20; .005 INJECTION, SOLUTION EPIDURAL; INFILTRATION; INTRACAUDAL; PERINEURAL at 18:21

## 2018-10-13 RX ADMIN — SODIUM CHLORIDE, POTASSIUM CHLORIDE, SODIUM LACTATE AND CALCIUM CHLORIDE: 600; 310; 30; 20 INJECTION, SOLUTION INTRAVENOUS at 17:02

## 2018-10-13 RX ADMIN — LIDOCAINE HYDROCHLORIDE,EPINEPHRINE BITARTRATE 2 ML: 20; .005 INJECTION, SOLUTION EPIDURAL; INFILTRATION; INTRACAUDAL; PERINEURAL at 18:24

## 2018-10-13 RX ADMIN — OXYTOCIN-SODIUM CHLORIDE 0.9% IV SOLN 30 UNIT/500ML 100 ML/HR: 30-0.9/5 SOLUTION at 23:03

## 2018-10-13 RX ADMIN — CEFAZOLIN 2 G: 1 INJECTION, POWDER, FOR SOLUTION INTRAMUSCULAR; INTRAVENOUS at 18:20

## 2018-10-13 RX ADMIN — Medication 25 MCG: at 09:29

## 2018-10-13 RX ADMIN — SODIUM CITRATE AND CITRIC ACID MONOHYDRATE 30 ML: 500; 334 SOLUTION ORAL at 18:06

## 2018-10-13 RX ADMIN — ONDANSETRON 4 MG: 2 INJECTION INTRAMUSCULAR; INTRAVENOUS at 18:50

## 2018-10-13 RX ADMIN — AZITHROMYCIN MONOHYDRATE 500 MG: 500 INJECTION, POWDER, LYOPHILIZED, FOR SOLUTION INTRAVENOUS at 18:45

## 2018-10-13 RX ADMIN — Medication 5 MG: at 13:41

## 2018-10-13 RX ADMIN — FENTANYL CITRATE 100 MCG: 50 INJECTION, SOLUTION INTRAMUSCULAR; INTRAVENOUS at 18:21

## 2018-10-13 RX ADMIN — Medication 25 MCG: at 05:54

## 2018-10-13 RX ADMIN — LIDOCAINE HYDROCHLORIDE,EPINEPHRINE BITARTRATE 3 ML: 20; .005 INJECTION, SOLUTION EPIDURAL; INFILTRATION; INTRACAUDAL; PERINEURAL at 18:36

## 2018-10-13 RX ADMIN — SODIUM CHLORIDE, POTASSIUM CHLORIDE, SODIUM LACTATE AND CALCIUM CHLORIDE 1000 ML: 600; 310; 30; 20 INJECTION, SOLUTION INTRAVENOUS at 11:59

## 2018-10-13 RX ADMIN — ACYCLOVIR 400 MG: 400 TABLET ORAL at 16:58

## 2018-10-13 RX ADMIN — Medication 8 ML: at 12:50

## 2018-10-13 RX ADMIN — Medication 25 MCG: at 03:07

## 2018-10-13 RX ADMIN — MORPHINE SULFATE 10 MG: 10 INJECTION INTRAVENOUS at 03:07

## 2018-10-13 RX ADMIN — PENICILLIN G POTASSIUM 5 MILLION UNITS: 5000000 POWDER, FOR SOLUTION INTRAMUSCULAR; INTRAPLEURAL; INTRATHECAL; INTRAVENOUS at 14:01

## 2018-10-13 RX ADMIN — LIDOCAINE HYDROCHLORIDE,EPINEPHRINE BITARTRATE 5 ML: 20; .005 INJECTION, SOLUTION EPIDURAL; INFILTRATION; INTRACAUDAL; PERINEURAL at 18:15

## 2018-10-13 RX ADMIN — LIDOCAINE HYDROCHLORIDE,EPINEPHRINE BITARTRATE 3 ML: 15; .005 INJECTION, SOLUTION EPIDURAL; INFILTRATION; INTRACAUDAL; PERINEURAL at 12:48

## 2018-10-13 RX ADMIN — ACYCLOVIR 400 MG: 400 TABLET ORAL at 08:10

## 2018-10-13 RX ADMIN — Medication 10 ML/HR: at 12:50

## 2018-10-13 RX ADMIN — BUPIVACAINE HYDROCHLORIDE AND EPINEPHRINE BITARTRATE 10 ML: 2.5; .005 INJECTION, SOLUTION INFILTRATION; PERINEURAL at 19:36

## 2018-10-13 RX ADMIN — DEXAMETHASONE SODIUM PHOSPHATE 4 MG: 4 INJECTION, SOLUTION INTRA-ARTICULAR; INTRALESIONAL; INTRAMUSCULAR; INTRAVENOUS; SOFT TISSUE at 18:31

## 2018-10-13 RX ADMIN — MORPHINE SULFATE 3 MG: 1 INJECTION, SOLUTION EPIDURAL; INTRATHECAL; INTRAVENOUS at 19:07

## 2018-10-13 RX ADMIN — EPHEDRINE SULFATE 5 MG: 50 INJECTION, SOLUTION INTRAMUSCULAR; INTRAVENOUS; SUBCUTANEOUS at 13:41

## 2018-10-13 RX ADMIN — PHENYLEPHRINE HYDROCHLORIDE 0.3 MCG/KG/MIN: 10 INJECTION, SOLUTION INTRAMUSCULAR; INTRAVENOUS; SUBCUTANEOUS at 18:15

## 2018-10-13 RX ADMIN — LIDOCAINE HYDROCHLORIDE,EPINEPHRINE BITARTRATE 5 ML: 20; .005 INJECTION, SOLUTION EPIDURAL; INFILTRATION; INTRACAUDAL; PERINEURAL at 18:18

## 2018-10-13 RX ADMIN — SODIUM CHLORIDE, POTASSIUM CHLORIDE, SODIUM LACTATE AND CALCIUM CHLORIDE 500 ML: 600; 310; 30; 20 INJECTION, SOLUTION INTRAVENOUS at 05:23

## 2018-10-13 RX ADMIN — FENTANYL CITRATE 50 MCG: 50 INJECTION INTRAMUSCULAR; INTRAVENOUS at 11:55

## 2018-10-13 RX ADMIN — HYDROXYZINE HYDROCHLORIDE 100 MG: 50 TABLET, FILM COATED ORAL at 01:28

## 2018-10-13 RX ADMIN — OXYTOCIN-SODIUM CHLORIDE 0.9% IV SOLN 30 UNIT/500ML 300 ML/HR: 30-0.9/5 SOLUTION at 18:48

## 2018-10-13 NOTE — PLAN OF CARE
Problem: Patient Care Overview  Goal: Plan of Care/Patient Progress Review  Outcome: No Change  AFVSS. Fetal heart tracing with moderate variability and accelerations, no decelerations. Contractions lasting  seconds. Contractions palpate mild. Drinking fluids frequently. Urine very light yellow. Abdomen soft, non-tender to palpation. Leaking clear fluid earlier in evening, none now. Patient perceiving contractions as cramping. Took hydroxyzine per orders, but states not feeling sleepy. States trying to rest, but is excited and contractions wake her up. States would like to take morphine to help with sleep and discomfort.  Morphine given per orders. Misoprostol given per orders. Continue to monitor for s/s infection. Continue misoprostol as ordered.

## 2018-10-13 NOTE — PROVIDER NOTIFICATION
10/12/18 0746   Provider Notification   Provider Name/Title Dr. Graham   Method of Notification Electronic Page   Request Evaluate - Remote   Notification Reason Other (Comment)   Call placed to Dr. Graham to clarify IV fluid orders and saline lock. Dr. Graham states ok to hold IV fluids and saline lock for now.

## 2018-10-13 NOTE — PLAN OF CARE
Data: Patient presented to [facility] at .   Reason for maternal/fetal assessment per patient is Rule out rupture of membranes  .  Patient is a . Prenatal record reviewed.      Obstetric History       T0      L0     SAB0   TAB0   Ectopic0   Multiple0   Live Births0       # Outcome Date GA Lbr Dany/2nd Weight Sex Delivery Anes PTL Lv   1 Current               . Medical history: No past medical history on file.. Gestational Age 37w6d. VSS. Fetal movement present. Patient denies backache, pelvic pressure, UTI symptoms, GI problems, bloody show, vaginal bleeding, edema, headache, visual disturbances, epigastric or URQ pain. Support persons, Sergei, present.  Action: Verbal consent for EFM. Triage assessment completed. EFM applied upon arrival. Uterine assessment showed contractions q4-6minutes. Fetal assessment: Presumed adequate fetal oxygenation documented (see flow record).   Response: Dr. Graham informed of patients arrival and SROM. Plan per provider is to admit patient and start misoprostol. Patient verbalized agreement with plan. Patient transferred to room 477 ambulatory, oriented to room and call light.

## 2018-10-13 NOTE — PROGRESS NOTES
Labor progress note    S:  Pt is comfortable with epidural  RN called with concerns about FHR decels      O:  Blood pressure 117/58, pulse 70, temperature 98.4  F (36.9  C), temperature source Oral, resp. rate 16, SpO2 90 %.  General appearance: comfortable.  Contractions: Every 2-4 minutes. 60-70 seconds duration.  Palpate: moderate.  FHT: Baseline 150 with mod  variability. Accelerations occ present. Late  decelerations present   resolving with Multiple position changes per RN, iv fluid bolus and oxygen 100% O2 on per mask.      ROM: clear fluid.  Pelvic exam:deferred   Pitocin- none,  Antibiotics- PCN      A:  IUP @ 38w0d early labor PROM afebrile intermittent late decelerations   Fetal Heart rate tracing Category category two  GBS- unknown   Patient Active Problem List   Diagnosis     Premature rupture of membranes     Anemia during pregnancy in third trimester     History of herpes genitalis     Supervision of normal first pregnancy     Vitamin D deficiency     Labor and delivery indication for care or intervention         P:  Prophylactic antibiotic for + GBS status  MD consultant on call Dr. Lozano / available prn  reevaluate /PRN  Closely monitor FHR  Discussed concerns with couple if she has  persistent late decelerations concerning for fetus if remote from delivery.    Continue with  Frequent position changes, IV fluids bolus prn. Oxygen on    Patricia Hart CNM APRN

## 2018-10-13 NOTE — PROGRESS NOTES
Labor progress note    S:  Pt is comfortable with epidural     O:  Blood pressure 108/54, pulse 72, temperature 98.5  F (36.9  C), temperature source Oral, resp. rate 16, SpO2 100 %.  General appearance: comfortable.  Contractions: Every 2-4 minutes. 60 seconds duration.  Palpate: moderate.  FHT: Baseline 145 with nid  variability. Accelerations are present. occ late  decelerations present  X 2 resolved with position change.  Then 2 more  Resolved with position change to R lat..  ROM: clear fluid. Membranes have been ruptured for 17 hours.  Pelvic exam: 4.5/ 80%/ Mid/ soft/ -1 feels asynclitic     Pitocin- none,  Antibiotics- PCN      A:  IUP @ 38w0d early labor   Fetal Heart rate tracing Category category two  GBS- unknown  Collected 10/12  Patient Active Problem List   Diagnosis     Premature rupture of membranes     Anemia during pregnancy in third trimester     History of herpes genitalis     Supervision of normal first pregnancy     Vitamin D deficiency     Labor and delivery indication for care or intervention         P:  Anticipate   MD consultant on call Dr. Lozano / available prn  reevaluate in 2-4 hours/PRN  Monitor EFM closely       Patricia Hart

## 2018-10-13 NOTE — PLAN OF CARE
Problem: Patient Care Overview  Goal: Plan of Care/Patient Progress Review  Outcome: No Change  Patient with 1.5-4 minute long contractions. Has been drinking fluids. Urine clear yellow and voiding every 1.5-2.5 hours. Denies feeling contractions. Contractions palpate mild. Will hold next dose of misoprostol. Will give IV fluid bolus and reassess uterine response to bolus.

## 2018-10-13 NOTE — ANESTHESIA PREPROCEDURE EVALUATION
Anesthesia Evaluation       history and physical reviewed . Pt has not had prior anesthetic     No history of anesthetic complications (no prior anesthetics. no family h/o anesthetic complications.)          ROS/MED HX    ENT/Pulmonary:     (+), recent URI resolved 1.5 wk ago: . .   (-) asthma   Neurologic:  - neg neurologic ROS     Cardiovascular:  - neg cardiovascular ROS       METS/Exercise Tolerance:     Hematologic:     (+) Anemia, -      Musculoskeletal:  - neg musculoskeletal ROS       GI/Hepatic:     (+) GERD      (-) hepatitis   Renal/Genitourinary:         Endo:  - neg endo ROS       Psychiatric:  - neg psychiatric ROS       Infectious Disease:  - neg infectious disease ROS       Malignancy:      - no malignancy   Other:    (+) Possibly pregnant                    Physical Exam  Normal systems: cardiovascular, pulmonary and dental    Airway   Mallampati: II  TM distance: > 3 FB  Neck ROM: full  Mouth opening: > 3 cm    Dental     Cardiovascular       Pulmonary           neg OB ROS                 Anesthesia Plan      History & Physical Review  History and physical reviewed and following examination; no interval change.    ASA Status:  2 emergent.  OB Epidural Asa: 2 and emergent   NPO Status:  Full stomach    Plan for Epidural   PONV prophylaxis:  Ondansetron (or other 5HT-3) and Dexamethasone or Solumedrol  Additional equipment: Videolaryngoscope Discussed risks of epidural anesthesia including nausea, vomiting, neurologic complications, itching, headache, bleeding, infection, cardiopulmonary complications,  and serious complications.    Discussed risks of epidural and general anesthesia for  section including nausea, vomiting, sore throat, dental damage, itching, headache, infection, bleeding, neurologic complications, cardiopulmonary complications, blood transfusion, and serious complications.          Postoperative Care  Postoperative pain management:  Peripheral nerve block (Single Shot), IV  analgesics, Oral pain medications, Neuraxial analgesia and Multi-modal analgesia.      Consents  Anesthetic plan, risks, benefits and alternatives discussed with:  Patient and Patient.  Use of blood products discussed: Yes.   Use of blood products discussed with Patient.  Consented to blood products.  .                          .

## 2018-10-13 NOTE — ANESTHESIA PROCEDURE NOTES
Epidural Procedure Note    Staff:     Anesthesiologist:  KELSEY BERGERON  Location: OB     Procedure start time:  10/13/2018 12:42 PM     Procedure end time:  10/13/2018 12:52 PM   Pre-procedure checklist:   patient identified, IV checked, site marked, risks and benefits discussed, informed consent, monitors and equipment checked, pre-op evaluation, at physician/surgeon's request and post-op pain management      Correct Patient: Yes      Correct Position: Yes      Correct Site: Yes      Correct Procedure: Yes      Correct Laterality:  N/A    Site Marked:  N/A  Procedure:     Procedure:  Epidural catheter    ASA:  2 and Emergent    Diagnosis:  Labor pain    Position:  Sitting    Sterile Prep: chloraprep      Insertion site:  L4-5    Local skin infiltration:  1% lidocaine    amount (mL):  3    Approach:  Midline    Needle gauge (G):  17    Needle Length (in):  3.5    Block Needle Type:  Touhy    Injection Technique:  LORT saline    ANOOP at (cm):  5    Attempts:  1    Redirects:  0    Catheter gauge (G):  19    Catheter threaded easily: Yes      Threaded to cm at skin:  9    Threaded in epidural space (cm):  4    Paresthesias:  No    Aspiration negative for Heme or CSF: Yes      Test dose (mL):  3     Local anesthetic:  Lidocaine 1.5% w/ 1:200,000 epinephrine    Test dose time:  12:48    Test dose negative for signs of intravascular, subdural or intrathecal injection: Yes

## 2018-10-13 NOTE — H&P
[facility]  Ob Admit /Triage Note    Maya Pedro MRN# [id number]   Age: 33 year old YOB: 1985     Date of Admission: 10/12/2018 10:47 PM  Date of service: 10/12/2018.       History of Present Illness (Resident / Clinician):   Maya Pedro is a patient of Sebastián Calderon from [facility].   She is a 33 year old  who is 37w6d pregnant with NORM  Oct 27, 2018, by 6 wk US (unknown LMP).    [facility] at 2200 tonight - Federico Urias (RN) she felt a large gush of fluid that soaked through her pants.  She reports irregular contractions that only started when she showed up in triage, and occurring every 4-6 minutes. She reports fluid leakage. She denies bleeding per vagina. Fetal movement is normal.    Her prenatal course has been uncomplicated.    Prenatal labs   No results found for: ABO, RH, AS, HEPBANG, CHPCRT, GCPCRT, TREPAB, RUBELLAABIGG, HGB, HIV, GBS  Per Care Everywhere:  A+, antibody neg  Hep B alda (Ab +); HIV neg; RPR neg x 2 during pregnancy  Rubella immune  Passed GCT  Pap NIL, HPV neg  GC/CT neg  Urine Culture - no growth    GBS was collected today (10/12/18) in clinic - results pending.  Weight gain during pregnancy: 27 lbs.              Obstetrical History:   She is a 33 year old   Her OB history:   Obstetric History       T0      L0     SAB0   TAB0   Ectopic0   Multiple0   Live Births0       # Outcome Date GA Lbr Dany/2nd Weight Sex Delivery Anes PTL Lv   2 Current            1 AB                        Immunzations:     Most Recent Immunizations   Administered Date(s) Administered     Influenza Vaccine IM 3yrs+ 4 Valent IIV4 10/12/2018     TDAP Vaccine (Adacel) 08/15/2018     Patient Active Problem List   Diagnosis     Premature rupture of membranes     Anemia during pregnancy in third trimester     History of herpes genitalis     Supervision of normal  first pregnancy     Vitamin D deficiency            Past Medical History:   No significant PMH noted in Care Everywhere         Past Surgical History:   Bowel surgery in 1987         Family History:   No family history on file.         Social History:   no tobacco use  no alcohol use  no illicit drug use         Medications:     No current facility-administered medications on file prior to encounter.   No current outpatient prescriptions on file prior to encounter.   Prenatal Vits  Vit D  Senna  Acyclovir (though didn't report to the nurse that she has been taking it)         Allergies:   Review of patient's allergies indicates no known allergies.         Review of Systems:   Please see intake from RN (Bridgett)  No reported complaints other than noted in HPI by report (LOF and mild CTX)         Physical Exam:   Vitals:   /74  Pulse 71  Temp 98.4  F (36.9  C) (Oral)  Resp 18  0 lbs 0 oz  There is no height or weight on file to calculate BMI.    Per RN:   Cervix 1/50/-3 (Cross <7)    Fetal Non-Stress Test Results    NST Ordered By: Subha Graham MD       NST Start & Stop Times  Start: 2200  Stop: 2220    NST Results  Fetus A   Baseline Rate: 130  Accelerations: Present  Decelerations: None  Interpretation: reactive    Results for orders placed or performed during the hospital encounter of 10/12/18   Platelet count   Result Value Ref Range    Platelet Count 221 150 - 450 10e9/L   Hemoglobin   Result Value Ref Range    Hemoglobin 10.7 (L) 11.7 - 15.7 g/dL   Hepatitis B surface antigen   Result Value Ref Range    Hep B Surface Agn Negative    HIV Antigen Antibody Combo   Result Value Ref Range    HIV Antigen Antibody Combo Negative    Rubella Antibody IgG Quantitative   Result Value Ref Range    Rubella Antibody IgG Quantitative Immune IU/mL   ABO and Rh   Result Value Ref Range    ABO PENDING     Specimen Expires 10/15/2018    Rupture of Fetal Membranes by ROM Plus   Result Value Ref Range    Rupture of Fetal  Membranes by ROM Plus Positive (A) NEG^Negative         Assessment and Plan:   Assessment:   Maya Pedro is a 33 year old  at 37w6d with PROM and mild CTX   Patient Active Problem List   Diagnosis     Premature rupture of membranes     Anemia during pregnancy in third trimester     History of herpes genitalis     Supervision of normal first pregnancy     Vitamin D deficiency         Plan:   Admit - see IP orders  - cervical ripening with misoprostol  Pain medication - sleep meds available for now  GBS unknown for now, start ABX if develops fever or if prolonged ROM (>18 hrs)  Anticipate   H/o genital herpes - was on acyclovir  - no new lesions or report of a new breakout (exam not yet done to confirm)    # Pain Assessment:  Current Pain Score 10/12/2018   Patient currently in pain? yes   Pain location Abdomen   Pain descriptors Cramping   Pain meds discussed for tonight to help with sleep        Subha Graham MD

## 2018-10-13 NOTE — PROGRESS NOTES
Labor progress note    S:   pt is comfortable resting. Oxygen mask on      O:  Blood pressure 117/58, pulse 70, temperature 98.4  F (36.9  C), temperature source Oral, resp. rate 16, SpO2 90 %.  General appearance: comfortable.  Contractions: Every 2-4 minutes. 60 seconds duration.  Palpate: moderate.  FHT: Baseline 140 with mod variability. Accelerations are present. Late  decelerations present.  ROM: clear fluid.   Pelvic exam: 5/ 80%/ Anterior/ soft/ -1    Pitocin- none,  Antibiotics- PCN      A:  IUP @ 38w0d early labor   Fetal Heart rate tracing Category category two  GBS- unknown   Patient Active Problem List   Diagnosis     Premature rupture of membranes     Anemia during pregnancy in third trimester     History of herpes genitalis     Supervision of normal first pregnancy     Vitamin D deficiency     Labor and delivery indication for care or intervention         P:  MD consultant on call Dr. Lozano notified reviewing FHR strip  Will be in to assess pt shortly   Continue with repositioning, monitor closely, oxygen, IV fluid bolus  Discussed with couple/family at bedside possibility of needing  section if persistent late decels and not close to delivering due to fetal intolerance    Type and screen added to labs / available prn     Patricia Hart CNM APRN

## 2018-10-13 NOTE — PROVIDER NOTIFICATION
10/13/18 0655   Provider Notification   Provider Name/Title Dr. Graham   Method of Notification Phone   Dr. Chaisson called this RN for patient update from overnight. Provider informed that patient received 4 doses of misoprostol overnight. Patient had hydroxyzine as ordered, then later morphine as ordered-03am. Patient initially perceiving contractions as cramping, not feeling after morphine given. Patient having contractions as long as 4 minutes. IV bolus given. Patient resting comfortably. MD states Ping Hart to be assuming care this am.

## 2018-10-13 NOTE — PROGRESS NOTES
[facility]  Intrapartum Progress Note  2018 7:21 AM  Date of service: 10/13/2018.    SUBJECTIVE:  Doing well. Per RN (Na), patient is resting comfortably right now.  Rec'd hydroxyzine initially around 0000, then Morphine at 0300. Not reporting further LOF and not terribly uncomfortable.  Eventually wants an epidural.    OBJECTIVE:   Patient Vitals for the past 8 hrs:   BP Temp Temp src Resp   10/13/18 0650 - 97.9  F (36.6  C) Oral 16   10/13/18 0554 - 97.5  F (36.4  C) Oral 16   10/13/18 0505 - 98  F (36.7  C) Oral -   10/13/18 0407 107/56 97.5  F (36.4  C) Oral 16   10/13/18 0325 - 98  F (36.7  C) Oral -   10/13/18 0230 - 98  F (36.7  C) Oral -   10/13/18 0130 - 98.1  F (36.7  C) Oral -   10/13/18 0105 107/58 - - 18   10/13/18 0030 - 98.3  F (36.8  C) Oral -   10/12/18 2330 - 98.3  F (36.8  C) Oral -     Per RN: no recent need to check cervix; last exam 50/-3 at 2300 on 10/12    FHT: Baseline 120 bpm. Variability is  moderate (5-25 bpm),  Accelerations are Absent, decelerations are Absent  TOCO: Contractions every 2-6 minutes, some are lasting long  Tracing is Category 1        .    ASSESSMENT and PLAN:  Maya Pedro is a 33 year old  at 38w0d with PROM on 10/12 at 2200 and currently in early, induced labor (oral miso x4)   Patient Active Problem List   Diagnosis     Premature rupture of membranes     Anemia during pregnancy in third trimester     History of herpes genitalis     Supervision of normal first pregnancy     Vitamin D deficiency       1.  Labor: induced with cytotec for 4 doses. early labor.         Continue expectant management.  Limited cervical checks due to ROM.   2.  Fetal Heart Rate Tracing: category one  3.  GBS unknown.  Antibiotics are not indicated at present (18 hours since ROM will be at 1600 today.  4.  Pain Management: used sleep meds overnight, planning epidural eventually  5.  H/o Genital HSV: on acyclovir; no reported breakouts, but exam not  yet confirmed.    # Pain Assessment:  Current Pain Score 10/13/2018   Patient currently in pain? denies   Pain location -   Pain descriptors -   Maya s pain level was assessed and she currently denies pain.      Case discussed with S CNMINERVA, Ping Hart, who will be accepting care today.      Subha Graham MD

## 2018-10-13 NOTE — PROGRESS NOTES
Asked to see patient by Ping Hart CNM for cat 2 FHR tracing.     Patient is a 33 year old  @38w0d admitted yesterday evening with PROM. Underwent induction with miso and now is laboring spontaneously. Recurrent late decelerations noted. Patient has been fluid resuscitated and position changes with no improvement in FHR tracing. Cervix 5cm over past 3-4 hours with minimal change. Pregnancy otherwise complicated by h/o HSV, no current outbreaks.     History reviewed. No pertinent past medical history.  History reviewed. No pertinent surgical history.    O:   Vitals:    10/13/18 1623 10/13/18 1646 10/13/18 1731   BP: 117/58  115/62   Pulse:   74   Resp:      Temp:  98.4  F (36.9  C) 98.7  F (37.1  C)   SpO2: 90%  99%     FHR: 140 moderate variability, recurrent late decelerations present, no accelerations.   TOCO: Ctx Q2min   SVE: 5 cm per CNM  Hemoglobin   Date Value Ref Range Status   10/12/2018 10.7 (L) 11.7 - 15.7 g/dL Final       A/P: 33 year old  @38w0d with cat 2 FHR tracing remote from delivery  Reviewed proceeding with primary CS at this time as no improvement with intra uterine resuscitation. Patient and family in agreement with plan. Risks and benefits of procedure reviewed and all questions answered.     Yolanda Lozano MD

## 2018-10-13 NOTE — ED NOTES
Pt arrived to ED with complaint of labor  Pt reports 38 weeks pregnant.   Pt is having contractions Yes.   Pt feels urge to push No.   Pt reports water broke Yes.   Report was called and pt was transferred to L&D Yes.

## 2018-10-13 NOTE — PROVIDER NOTIFICATION
10/12/18 220   Provider Notification   Provider Name/Title Dr. Graham   Method of Notification Electronic Page   Request Evaluate - Remote   Notification Reason Patient Arrived     Notified Provider: , 37w6d. Pt arrived, ruptured at , clear fluid, ROM+ positive. Ctx just started, q4-6min. Pt comfortable, but feels pressure. SVE /-3. FHR reactive. GBS unknown - collected in the clinic today.    Spoke with Dr. Graham on the phone, she discussed starting misoprostol - pt agreed. Plan per provider is to start misoprostol and consider pt as GBS unknown. Will continue to monitor.

## 2018-10-13 NOTE — PROGRESS NOTES
Labor progress note    S:  Pt reports much more uncomfortable desires epidural now  CNM had discussed checking cervix once fentanyl wore off   Pt received epidural prior to CNM returning for exam  Busy with labor pts     O:  Blood pressure 113/55, pulse 72, temperature 98.1  F (36.7  C), temperature source Oral, resp. rate 18, SpO2 100 %.  General appearance: uncomfortable with contractions.  Contractions: Every 2-5 minutes. 60 seconds duration.  Palpate: moderate.  FHT: Baseline 145 with mod variability. Accelerations are present. no decelerations present.  ROM: clear fluid. Membranes have been ruptured for 15 hours.  Pelvic exam:deferred   Pitocin- none,  Antibiotics- none  SROM x 15 + hrs     A:  IUP @ 38w0d early labor   Fetal Heart rate tracing Category category one  GBS- not done  Patient Active Problem List   Diagnosis     Premature rupture of membranes     Anemia during pregnancy in third trimester     History of herpes genitalis     Supervision of normal first pregnancy     Vitamin D deficiency         P:  Pain medication epidural now   Anticipate   reevaluate in 2-4 hours/PRN  Plan unknown GBS status  PROM will start IV antibiotic prophylaxis  Discussed with pt    Agrees to plan   Patricia Hart CNM APRN

## 2018-10-14 LAB — HGB BLD-MCNC: 10.2 G/DL (ref 11.7–15.7)

## 2018-10-14 PROCEDURE — 12000030 ZZH R&B OB INTERMEDIATE UMMC

## 2018-10-14 PROCEDURE — 25000132 ZZH RX MED GY IP 250 OP 250 PS 637: Performed by: STUDENT IN AN ORGANIZED HEALTH CARE EDUCATION/TRAINING PROGRAM

## 2018-10-14 PROCEDURE — 85018 HEMOGLOBIN: CPT | Performed by: STUDENT IN AN ORGANIZED HEALTH CARE EDUCATION/TRAINING PROGRAM

## 2018-10-14 PROCEDURE — 25000128 H RX IP 250 OP 636: Performed by: STUDENT IN AN ORGANIZED HEALTH CARE EDUCATION/TRAINING PROGRAM

## 2018-10-14 PROCEDURE — 36415 COLL VENOUS BLD VENIPUNCTURE: CPT | Performed by: STUDENT IN AN ORGANIZED HEALTH CARE EDUCATION/TRAINING PROGRAM

## 2018-10-14 RX ORDER — MEDROXYPROGESTERONE ACETATE 150 MG/ML
150 INJECTION, SUSPENSION INTRAMUSCULAR ONCE
Status: DISCONTINUED | OUTPATIENT
Start: 2018-10-15 | End: 2018-10-15

## 2018-10-14 RX ORDER — AMOXICILLIN 250 MG
1 CAPSULE ORAL 2 TIMES DAILY PRN
Qty: 40 TABLET | Refills: 0 | Status: SHIPPED | OUTPATIENT
Start: 2018-10-14

## 2018-10-14 RX ORDER — IBUPROFEN 800 MG/1
800 TABLET, FILM COATED ORAL EVERY 6 HOURS PRN
Qty: 40 TABLET | Refills: 0 | Status: SHIPPED | OUTPATIENT
Start: 2018-10-14

## 2018-10-14 RX ADMIN — KETOROLAC TROMETHAMINE 30 MG: 30 INJECTION, SOLUTION INTRAMUSCULAR at 08:11

## 2018-10-14 RX ADMIN — ACETAMINOPHEN 975 MG: 325 TABLET, FILM COATED ORAL at 10:20

## 2018-10-14 RX ADMIN — ACETAMINOPHEN 975 MG: 325 TABLET, FILM COATED ORAL at 01:20

## 2018-10-14 RX ADMIN — KETOROLAC TROMETHAMINE 30 MG: 30 INJECTION, SOLUTION INTRAMUSCULAR at 21:51

## 2018-10-14 RX ADMIN — KETOROLAC TROMETHAMINE 30 MG: 30 INJECTION, SOLUTION INTRAMUSCULAR at 01:20

## 2018-10-14 RX ADMIN — KETOROLAC TROMETHAMINE 30 MG: 30 INJECTION, SOLUTION INTRAMUSCULAR at 14:32

## 2018-10-14 RX ADMIN — ACETAMINOPHEN 975 MG: 325 TABLET, FILM COATED ORAL at 18:32

## 2018-10-14 RX ADMIN — SENNOSIDES AND DOCUSATE SODIUM 2 TABLET: 8.6; 5 TABLET ORAL at 19:50

## 2018-10-14 NOTE — PLAN OF CARE
Problem: Labor (Cervical Ripen, Induct, Augment) (Adult,Obstetrics,Pediatric)  Goal: Signs and Symptoms of Listed Potential Problems Will be Absent, Minimized or Managed (Labor)  Signs and symptoms of listed potential problems will be absent, minimized or managed by discharge/transition of care (reference Labor (Cervical Ripen, Induct, Augment) (Adult,Obstetrics,Pediatric) [facility]).   Resting comfortably, feeling effects of MS and vistaril NOC. Due for miso, will await CNM to determine oral, vaginal, SVE then determine if pitocin. VSS, FHR AGA, pt desires to go back to sleep. Enc pt to ask questions.  sleeping in chair next to bed.

## 2018-10-14 NOTE — PROGRESS NOTES
Single nonsustained severe range and multiple mild range BPs in PACU. Asymptomatic.     HELLP labs now to r/o postpartum preE.    Abby Forde MD  PGY3

## 2018-10-14 NOTE — PROVIDER NOTIFICATION
10/13/18 2100   Provider Notification   Provider Name/Title Dr. Forde   Method of Notification Electronic Page   Request Evaluate-Remote   Notification Reason Vital Signs Change   Text page sent to notify provider that pt has had BP's in 130-140's, with one 160, since arrival to PACU. This is a change from pre-surgery where BP's were WNL.

## 2018-10-14 NOTE — PLAN OF CARE
Data: Maya Pedro transferred to [id number] via cart at 2210. Baby transferred to NICU for low BG levels prior to mother's transfer to [facility].  Action: Receiving unit notified of transfer: Yes. Patient and family notified of room change. Report given to MARITA Chavez at 2215 at pt's bedside. Belongings sent to receiving unit. Accompanied by Registered Nurse. Oriented patient to surroundings. Call light within reach.  Response: Patient tolerated transfer and is stable.

## 2018-10-14 NOTE — DISCHARGE SUMMARY
DELIVERY DISCHARGE SUMMARY    Admit date: 10/12/2018  Discharge date: 10/16/18    Admit Dx:   - 33 year old y/o  at 37w6d  - HSV on valacyclovir  - Iron deficiency anemia    Discharge Dx:  - Same as above, s/p primary low transverse  section  - acute on chronic blood loss anemia  Procedures:  - Primary low transverse  section with double layer closure via Pfannenstiel incision  - Spinal analgesia    Admit HPI:  Maya Pedro is a 33 year old  at 38w0d admitted on 10/12/18 to the Midwife service for IOL for PROM.   Please see her admit H&P for full details of her PMH, PSH, Meds, Allergies and exam on admit.    Hospital course:  She underwent induction with misoprostol and then labored spontaneously. Recurrent decelerations were noted and did not improve with fluid resuscitation and position changes. Cervix was 5cm and making minimal change over 3-4 hours.  section was then recommended due to category II FHT remote from delivery.  After discussion of risk and and benefits and signing informed consent the patient was taken to the operating room for primary  section.    EBL from the delivery was 716cc. Please see her  Section Operative Note for full details regarding her delivery.    Her postoperative course was uncomplicated. On POD#3, she was meeting all of her postpartum goals and deemed stable for discharge. She was voiding without difficulty, tolerating a regular diet without nausea and vomiting, her pain was well controlled on oral pain medicines and her lochia was appropriate. Her hemoglobin prior to delivery was 10.7 and after delivery was 10.2. Her Rh status was positive and Rhogam was not indicated.  She was pumping for her infant in the NICU.  She was discharged to a boarding room.    Discharge Medications:     Review of your medicines      START taking       Dose / Directions    ibuprofen 800 MG tablet   Commonly known as:  ADVIL/MOTRIN         Dose:  800 mg   Take 1 tablet (800 mg) by mouth every 6 hours as needed for other (cramping)   Quantity:  40 tablet   Refills:  0       oxyCODONE IR 5 MG tablet   Commonly known as:  ROXICODONE        Dose:  5 mg   Take 1 tablet (5 mg) by mouth every 6 hours as needed for severe pain   Quantity:  10 tablet   Refills:  0       senna-docusate 8.6-50 MG per tablet   Commonly known as:  SENOKOT-S;PERICOLACE        Dose:  1 tablet   Take 1 tablet by mouth 2 times daily as needed for constipation   Quantity:  40 tablet   Refills:  0         CONTINUE these medicines which have NOT CHANGED       Dose / Directions    ACYCLOVIR PO        Dose:  1 g   Take 1 g by mouth daily   Refills:  0       COLACE PO        Dose:  100 mg   Take 100 mg by mouth   Refills:  0       IRON SUPPLEMENT PO        Dose:  325 mg   Take 325 mg by mouth daily   Refills:  0       prenatal multivitamin plus iron 27-0.8 MG Tabs per tablet   Indication:  Pregnancy        Dose:  1 tablet   Take 1 tablet by mouth daily   Refills:  0       VITAMIN D (CHOLECALCIFEROL) PO        Take by mouth daily   Refills:  0            Where to get your medicines      These medications were sent to [facility] [address]  [address] [address]     Phone:  [phone]      ibuprofen 800 MG tablet     senna-docusate 8.6-50 MG per tablet         Some of these will need a paper prescription and others can be bought over the counter. Ask your nurse if you have questions.     Bring a paper prescription for each of these medications      oxyCODONE IR 5 MG tablet             Discharge/Disposition:  Maya Pedro was discharged to home in stable condition with the following instructions/medications:  1) Call for temperature > 100.4, bright red vaginal bleeding >1 pad an hour x 2 hours, foul smelling vaginal discharge, pain not controlled by usual oral pain meds, persistent nausea and vomiting not controlled on  medications, drainage or redness from incision site  2) She desired depo provera for contraception.  3) For feeding she decided to breast and bottle feed.  4) She was instructed to follow-up with her primary OB in 6 weeks for a routine postpartum visit.  5) Discharge activity:  No heavy lifting >15 lbs or strenuous activity for 6 weeks, pelvic rest for 6 weeks, no driving or operating machinery while on narcotics.    Amy Schumer, MD  Obstetrics and Gynecology, PGY-2  Pager: [phone]      Appreciate Dr. Schumer's summary above, patient also seen and examined by me on the day of discharge. I agree with the summary above.   Yolanda De Souza MD

## 2018-10-14 NOTE — PLAN OF CARE
Problem: Patient Care Overview  Goal: Plan of Care/Patient Progress Review  Outcome: Improving  Vitals stable. PP assessments wnl. Pain well managed with IV Toradol and tylenol. Assisted pt out of the bed for the first time. Went to the bathroom, pt tolerated activity well. Hollis has been removed. She has attempted to urinate but was unable to do so. She is working on drinking more fluids. She wants to try to void before doing catheterization. Will give her another hour.  She is ambulating in the room independently.  passing gas. Toleraring a regular diet. Pumping every three hours for infant down in NICU. Making visitation to see infant. Continue with plan of care.

## 2018-10-14 NOTE — ANESTHESIA POSTPROCEDURE EVALUATION
Patient: Maya Pedro    Procedure(s):   - Wound Class: II-Clean Contaminated    Diagnosis:Pregnancy  Diagnosis Additional Information: No value filed.    Anesthesia Type:  Epidural    Note:  Anesthesia Post Evaluation    Patient location during evaluation: OB PACU  Patient participation: Able to participate in evaluation but full recovery from regional anesthesia has not yet ocurrred but is anticipated to occur within 48 hours (Spinal wearing off)  Level of consciousness: awake and alert  Pain management: adequate  Airway patency: patent  Cardiovascular status: acceptable  Respiratory status: acceptable  Hydration status: acceptable  PONV: none     Anesthetic complications: None          Last vitals:  Vitals:    10/13/18 2115 10/13/18 2130 10/13/18 2145   BP: 137/82 144/76 140/77   Pulse:      Resp: 14 21 13   Temp:      SpO2: 98% 98% 99%         Electronically Signed By: Kade Jacob MD  October 13, 2018  10:00 PM

## 2018-10-14 NOTE — ANESTHESIA PROCEDURE NOTES
Peripheral Nerve Block Procedure Note    Staff:     Anesthesiologist:  MARIO MCLEOD  Location: OR AFTER induction  Procedure Start/Stop TImes:      10/13/2018 7:36 PM     10/13/2018 7:39 PM    patient identified, IV checked, site marked, risks and benefits discussed, informed consent, monitors and equipment checked, pre-op evaluation, at physician/surgeon's request and post-op pain management      Correct Patient: Yes      Correct Position: Yes      Correct Site: Yes      Correct Procedure: Yes      Correct Laterality:  Yes    Site Marked:  Yes  Procedure details:     Procedure:  TAP    Laterality:  Bilateral    Position:  Supine    Sterile Prep: chloraprep, patient draped, mask and sterile gloves      Needle:  Insulated and short bevel    Needle gauge:  21    Needle length (mm):  100    Abnormal pain on injection: No      Blood Aspirated: No      Paresthesias:  No    Bleeding at site: No      Bolus via:  Needle    Infusion Method:  Single Shot    Blood aspirated via catheter: No      Complications:  None  Assessment/Narrative:      Bilateral TAP block was performed under US guidance. A total of 20 ml of liposomal bupivacaine with 20 ml of saline and 10 ml of 0.25% bupivacaine with epinephrine 1:200k was injected.

## 2018-10-14 NOTE — LACTATION NOTE
"D:  I met with Maya and Sergei; [place] is their 1st.  She is normally in good health, takes no medications, and has no history of breast/chest surgery or trauma.  She has already started to pump, hand express and nurse.   I:  I did not give her a folder of introductory materials (will give the NICU folder tomorrow if she is still here) but went over pumping guidelines and challenges/ tips of feeding a SGA, sleepy, hypoglycemia baby.  I reviewed physiology of colostrum and milk production and pumping guidelines.  We talked about importance of time management if her baby is needing supplements and mom is needing to pump.  We talked about typical feeding plans in the this scenario (keep at breast as long as drinking, leaving enough time/ energy to supplement with MBM/DBM and to pump, decreasing IV fluids with appropriate glucose levels, reunited when IV is off and glucoses are stable).  We talked about birth control and other medications during breastfeeding.  She verbalized understanding via teachback.  We discussed supportive hold, positioning, latch, breastfeeding patterns and infant driven feeding, breast support and compressions, skin to skin benefits, and timing of pumpings around breastfeedings.  [place] had just finished nursing and was cuddling skin to skin, so we went over hand expression.  When gtts of colostrum appeared he perked up a bit and started mouthing at the nipple; we reviewed asymmetric latch, breast shaping (\"sandwich\") and breast compressions.  He did not latch this 2nd time and mom headed back to her room to pump/ hand express and eat/ shower, to return for the 1130 feeding.   A:  Mom has information she needs to initiate her supply.   P:  Will continue to provide lactation support.  Paulette Delgado, RNC, IBCLC         "

## 2018-10-14 NOTE — PLAN OF CARE
Problem: Labor (Cervical Ripen, Induct, Augment) (Adult,Obstetrics,Pediatric)  Goal: Signs and Symptoms of Listed Potential Problems Will be Absent, Minimized or Managed (Labor)  Signs and symptoms of listed potential problems will be absent, minimized or managed by discharge/transition of care (reference Labor (Cervical Ripen, Induct, Augment) (Adult,Obstetrics,Pediatric) CPG).   Outcome: Improving  Pt requested pain med and desired fentanyl but also requested prep start for epidural at same time.   Discussed IV bolus, CNM to talk to anes, intermittent cath, freq of VS, pos changes. Epidural placed and pt has good pain relief at this time.

## 2018-10-14 NOTE — PROGRESS NOTES
Patient arrived to [facility] unit via zoom cart at 2310,with belongings, accompanied by spouse/ significant other,  admitted to NICU for BG instabilities. Received report from Saad KIRKLAND. Unit and room orientation completed. Call light given; no concerns present at this time. Continue with plan of care.

## 2018-10-14 NOTE — PLAN OF CARE
Problem: Labor (Cervical Ripen, Induct, Augment) (Adult,Obstetrics,Pediatric)  Goal: Signs and Symptoms of Listed Potential Problems Will be Absent, Minimized or Managed (Labor)  Signs and symptoms of listed potential problems will be absent, minimized or managed by discharge/transition of care (reference Labor (Cervical Ripen, Induct, Augment) (Adult,Obstetrics,Pediatric) CPG).   Outcome: Improving  Periods of normal variability, but late decels, occas prolonged decels continued despite many pos changes, O2, fluid boluses. ANIA paged MD to review strip and make plan.

## 2018-10-14 NOTE — PROGRESS NOTES
[facility]   Post-partum Note    Name:  Maya Pedro  MRN: [id number]    S: Patient doing well this morning.  Pain is adequately controlled.  Denies headache, shortness of breath, increased swelling, epigastric pain. Hasn't been up walking yet, epidural hadn't worn off before bed last night. Tolerating regular diet without nausea or vomiting.  Hollis in place. Reports flatus.  Lochia present, states she doesn't know how much but did not need to change her pad overnight.  Breast feeding.  Plans Depo for postpartum contraception.     O:   Patient Vitals for the past 12 hrs:   BP Temp Temp src Heart Rate Resp SpO2   10/14/18 0800 131/80 97.9  F (36.6  C) Oral 75 16 100 %   10/14/18 0427 118/68 98.1  F (36.7  C) Oral 59 16 97 %   10/14/18 0232 123/68 98.3  F (36.8  C) Oral 70 16 97 %   10/14/18 0117 124/65 97.5  F (36.4  C) Axillary 66 16 95 %   10/13/18 2354 139/75 98.5  F (36.9  C) Oral 69 18 99 %   10/13/18 2300 136/74 - - 63 18 99 %   10/13/18 2243 150/82 - - 65 16 99 %   10/13/18 2145 140/77 - - 60 13 99 %   10/13/18 2130 144/76 - - 68 21 98 %   10/13/18 2115 137/82 - - 65 14 98 %   10/13/18 2100 145/81 - - 71 14 99 %   10/13/18 2045 135/87 98.4  F (36.9  C) Oral 71 16 99 %     Gen:  Resting comfortably, NAD  CV:  RRR, no murmur  Pulm:  CTAB, no wheezes  Abd:  Soft, appropriately ttp, non-distended. Fundus at  umbilicus, firm and non-tender.  Incision: c/d/i no surrounding redness or erythema  Ext:  non-tender, no LE edema b/l    I/O last 3 completed shifts:  In: 4750 [P.O.:850; I.V.:2900; IV Piggyback:1000]  Out: 4166 [Urine:3450; Blood:716]    Hgb:   Hemoglobin   Date Value Ref Range Status   10/14/2018 10.2 (L) 11.7 - 15.7 g/dL Final     Assessment/Plan:  33 year old  on PPD #1 s/p PLTCS for category II fetal heart tracing remote from delivery.    Routine postpartum cares:  Pain: Well-controlled with Toradol and tylenol  Hgb: 10.7>EBL 716cc> 10.2  Rh: pos  Rubella:  immune  Feed: breast  BC: Depo prior to discharge  Dispo: DC POD#2-3    Elevated BP: mild range blood pressures in PACU following . HELLP labs wnl. Normotensive this morning.  -Continue to monitor BP closely. Notify provider with BP >160/110    HSV: No active lesions. Continue PTA acyclovir    Heaven Burks MD  Ob/Gyn, PGY-1  10/14/2018, 6:24 AM      [facility] staff:  Appreciate note by Dr. Burks.  I have seen and examined the patient without the resident. I have reviewed, edited, and agree with the note.      Gretchen Goodwin MD  10/14/2018

## 2018-10-14 NOTE — OP NOTE
[facility]  Full Operative Progress Note     Surgery Date:  10/13/2018    Surgeon:  Yolanda Lozano MD    Assistants:  Abby Forde MD PGY-3    Heaven Burks MD PGY-1        Pre-op Diagnosis:  1. Intrauterine pregnancy at 38w0d     2. Category II FHT remote from delivery      3. HSV on valacyclovir     4. Iron deficiency anemia    Post-op Diagnosis:  1. Same      2. Liveborn Male infant     Procedure:  Primary low-transverse  section with double layer uterine closure via Pfannenstiel incision    Anesthesia: Bolused epidural    EBL:  716 cc    IVF:  1800 mL crystalloid    UOP:  400 mL clear urine at the end of the case    Drains: Hollis Catheter     Specimens:  Placenta, cord, gases     Complications: None apparent    Indications:   Maya Pedro is a 33 year old  at 38w0d admitted to the Midwife service for IOL for PROM. She underwent induction with misoprostol and then labored spontaneously. Recurrent decelerations were noted and did not improve with fluid resuscitation and position changes. Cervix was 5cm and making minimal change over 3-4 hours.  section was then recommended due to category II FHT remote from delivery.  The risks, benefits, and alternatives of  section were discussed with the patient, and she agreed to proceed.     Findings:   1. Clear amniotic fluid  2. Liveborn male infant in OP presentation at 1847 on 10/13. Apgars 7 at 1 minute & 9 at 5 minutes. Weight 4 pounds 15.7oz.  3. Arterial cord pH 7.27, base deficit 3.2. Venous cord pH 7.31, base deficit 2.6.  4. Normal uterus, fallopian tubes, and ovaries.     Procedure Details:   The patient was brought to the OR, where adequate anesthesia through her existing epidural was administered.  She was placed in the dorsal supine position with a slight leftward tilt. She was prepped and draped in the usual sterile fashion. A surgical time out was performed. A pfannenstiel skin incision  was made with the scalpel, and carried down to the underlying fascia with sharp and blunt dissection. The fascia was incised in the midline, and the incision was extended laterally with the Soler scissors. The superior aspect of the fascia was grasped with the Kocher clamps and dissected off of the underlying rectus muscles with blunt and sharp dissection. Attention was then turned to the inferior aspect of the fascia, which was similarly dissected off of the underlying rectus muscles. The rectus muscles were  in the midline, and the peritoneum was entered bluntly, and the opening was extended with digital pressure. The bladder blade was placed. A transverse hysterotomy was made with the scalpel in the lower uterine segment, and the incision was extended with digital pressure. The infant was noted to be in the OP position, and was delivered atraumatically. The shoulders delivered easily.  No nuchal cord was noted. The cord was doubly clamped and cut, and the infant was handed off to the awaiting NICU staff. A segment of cord was cut and sent for cord gases. The placenta was delivered with gentle traction on the umbilical cord and uterine massage. The uterus was exteriorized and cleared of all clots and debris. Uterine tone was noted to be excellent with 30 units of pitocin given through the running IV and uterine massage.  The hysterotomy was closed with a running locked suture of 0 Vicryl.  The hysterotomy was then imbricated using an 0 Monocryl suture. The hysterotomy was noted to be hemostatic. The posterior cul-de-sac was irrigated and cleared of all clots and debris. The uterus was returned to the abdomen. The pericolic gutters were irrigated and cleared of all clots and debris. The hysterotomy was reexamined and noted to be hemostatic.  The fascia and rectus muscles were examined and areas of oozing were controlled with electrocautery. The fascia was closed with a running 0 Vicryl suture. The  subcutaneous tissue was irrigated and areas of oozing were controlled with electrocautery. The subcutaneous tissue was less than 2 cm in thickness, and was therefore was not closed. The skin was closed with 4-0 monocryl and covered with steristrips and a sterile dressing.    All sponge, needle, and instrument counts were correct. The patient tolerated the procedure well, and was transferred to recovery in stable condition. Dr. Lozano was present and scrubbed for the entirety of the procedure.     Heaven Burks MD  Ob/Gyn PGY-1  October 13, 2018 7:45 PM      I was present and scrubbed for entire procedure.   Yolanda Lozano MD

## 2018-10-14 NOTE — PLAN OF CARE
Problem: Postpartum ( Delivery) (Adult,Obstetrics,Pediatric)  Goal: Signs and Symptoms of Listed Potential Problems Will be Absent, Minimized or Managed (Postpartum)  Signs and symptoms of listed potential problems will be absent, minimized or managed by discharge/transition of care (reference Postpartum ( Delivery) (Adult,Obstetrics,Pediatric) CPG).   Outcome: No Change  Pt doing well after C/S this evening. TAPS block was done, denies pain. Fundus remained firm, U/1 and bleeding scant. Infant was transferred to NICU for low BG levels. Pt's partner is at the bedside and supportive. Hollis draining clear, yellow urine. Pre-e labs done for elevated BP's in PACU, pt denies vision changes, headache, SOB, RUQ pain. Continue routine postpartum cares. Notify provider with concerns.

## 2018-10-14 NOTE — PLAN OF CARE
D: Pt requesting TAP Block for post-c/s pain control.  I: Pt positioned for procedure. Consent verified. Time out performed at 1930. Pt transferred to recovery room after procedure.  A: VSS. Pt tolerated procedure well.  P: Continue routine post-procedure cares as ordered.

## 2018-10-14 NOTE — BRIEF OP NOTE
[facility]   Brief Operative Note     Surgery Date: 10/13/2018    Surgeon:  Yolanda Lozano MD    Assistants:  Abby Forde MD PGY-3    Heaven Burks MD PGY-1    Pre-op Diagnosis:  1. Intrauterine pregnancy at 38w0d     2. Category II FHT remote from delivery        3. HSV on valcyclovir     4. Iron deficiency anemia    Post-op Diagnosis:  1. Same      2. Liveborn Male infant     Procedure:  Primary low-transverse  section with double layer uterine closure via Pfannenstiel incision    Anesthesia: Bolused epidural    EBL:  716 cc    IVF:  1800 cc crystalloid    UOP:  400 cc clear urine at the end of the case    Drains: Hollis Catheter     Specimens:  Placenta, cord segment    Complications: None apparent    Findings:   1. Clear amniotic fluid  2. Liveborn male infant in vertex presentation at 1847 on 10/13. Apgars 7 at 1 minute & 9 at 5 minutes. Weight pending.  3. Arterial cord pH 7.27, base deficit 3.2. Venous cord pH 7.31, base deficit 2.6.  4. Normal uterus, fallopian tubes, and ovaries.     Disposition:  Patient received TAPS blocks following the procedure in the OR and then was transferred in stable condition to [facility] Burks MD  OBGYN PGY-1

## 2018-10-14 NOTE — OR NURSING
Pt to PACU via cart.  VSS, temp 99.1 oral, 20 units of Pitocin infusing by gravity to piv without complications, joshua to gravity with light yellow colored urine. denies pain and nausea, dressing CDI.  I)IV to pump, compression to pneumoboots re-started.  A) Stable P) pt to inform RN if she experiences pain or nausea.  Post op cares.   Anticipate transfer to [facility] post partum at ~1.5 hours post op.

## 2018-10-15 PROCEDURE — 25000132 ZZH RX MED GY IP 250 OP 250 PS 637: Performed by: STUDENT IN AN ORGANIZED HEALTH CARE EDUCATION/TRAINING PROGRAM

## 2018-10-15 PROCEDURE — 12000028 ZZH R&B OB UMMC

## 2018-10-15 RX ORDER — OXYCODONE HYDROCHLORIDE 5 MG/1
5 TABLET ORAL EVERY 6 HOURS PRN
Qty: 10 TABLET | Refills: 0 | Status: SHIPPED | OUTPATIENT
Start: 2018-10-15

## 2018-10-15 RX ADMIN — ACETAMINOPHEN 975 MG: 325 TABLET, FILM COATED ORAL at 02:05

## 2018-10-15 RX ADMIN — SENNOSIDES AND DOCUSATE SODIUM 1 TABLET: 8.6; 5 TABLET ORAL at 19:20

## 2018-10-15 RX ADMIN — SENNOSIDES AND DOCUSATE SODIUM 1 TABLET: 8.6; 5 TABLET ORAL at 10:24

## 2018-10-15 RX ADMIN — IBUPROFEN 800 MG: 800 TABLET ORAL at 16:37

## 2018-10-15 RX ADMIN — ACETAMINOPHEN 975 MG: 325 TABLET, FILM COATED ORAL at 18:12

## 2018-10-15 RX ADMIN — IBUPROFEN 800 MG: 800 TABLET ORAL at 04:25

## 2018-10-15 RX ADMIN — ACETAMINOPHEN 975 MG: 325 TABLET, FILM COATED ORAL at 10:22

## 2018-10-15 RX ADMIN — IBUPROFEN 800 MG: 800 TABLET ORAL at 10:22

## 2018-10-15 NOTE — PLAN OF CARE
Problem: Patient Care Overview  Goal: Plan of Care/Patient Progress Review  Data: Vital signs within normal limits. Postpartum checks within normal limits - see flow record. Patient eating and drinking normally. Patient able to empty bladder independently and is up ambulating. No apparent signs of infection. Incision healing well, LUMA with steristrips. Patient performing self cares and is visiting infant in NICU.  Action: Patient medicated during the shift for pain. See MAR. Patient reassessed within 1 hour after each medication and pain was improved - patient stated she was comfortable. Patient education done about pumping frequency, postpartum cares. See flow record.  Response: Positive attachment behaviors observed with infant. Support persons  Sergei present.   Plan: Anticipate discharge later today or tomorrow if baby comes back from NICU.

## 2018-10-15 NOTE — PROGRESS NOTES
[facility]   Post-partum Note    Name:  Maya Pedro  MRN: [id number]    S: Patient doing well this morning.  Pain is adequately controlled.  Denies headache, shortness of breath, increased swelling, epigastric pain. Tolerating regular diet without nausea or vomiting. Voiding spontaneously.  Reports passing flatus.  Lochia diminishing.  Breast feeding and pumping while baby in NICU.  Plans Depo for contraception at discharge.     O:   Patient Vitals for the past 12 hrs:   BP Temp Temp src Heart Rate Resp   10/15/18 0215 117/64 97.8  F (36.6  C) Oral 67 14     Gen:  Resting comfortably, NAD  CV:  Regular rate and rhythm  Pulm:  CTAB  Abd:  Soft, appropriately ttp, non-distended. Fundus at 1 cm below umbilicus, firm and non-tender.  Incision: c/d/i no surrounding redness or erythema, steristrips in place  Ext:  non-tender, no LE edema b/l    I/O last 3 completed shifts:  In: 1200 [P.O.:1200]  Out: 3000 [Urine:3000]    Hgb:   Hemoglobin   Date Value Ref Range Status   10/14/2018 10.2 (L) 11.7 - 15.7 g/dL Final     Assessment/Plan:  33 year old  on POD #2 s/p PLTCS for category II fetal heart tracing remote from delivery.  Meeting goals for discharge.    Routine postpartum cares:  Pain: Well-controlled with ibuprofen and tylenol. Has not required oxycodone.   Hgb: 10.7>EBL 716cc> 10.2  Rh: pos  Rubella: immune  Feed: breast  BC: Depo prior to discharge  Dispo: DC POD#2-3 depending on baby's status and discharge from NICU    Elevated BP: mild range blood pressures in PACU following . HELLP labs wnl. Normotensive for past 24 hours.   -Continue to monitor BP closely. Please notify provider with BP >160/110    HSV: No active lesions. Continue PTA acyclovir    Amy Schumer, MD  OB/Gyn PGY-2  10/15/2018 6:47 AM      [facility] staff:    Appreciate note by Dr. Schumer.  I have seen and examined the patient without the resident. I have reviewed, edited, and agree  with the note.      At time of my exam, patient has decided to stay tonight. Baby is not being discharged until tomorrow AM. Anticipate discharge of patient tomorrow with baby.    Gretchen Goodwin MD  10/15/2018  2:24 PM

## 2018-10-15 NOTE — PLAN OF CARE
Problem: Patient Care Overview  Goal: Plan of Care/Patient Progress Review  Outcome: Improving  VS WDL. Postpartum assessment WDL, no incision drainage.  Patient is voiding but has yet to have a BM. She was encouraged to continue taking the stool softener and to drink fluids. She is ambulating on her own without assistance, but is using the wheel chair for visits to the NICU. She complains of pain when walking too much, but overall her pain has been controlled with Tylenol and Motrin.  Patient is BF and pumping. She last fed baby in NICU at 0430. Will continue to monitor and provide support.

## 2018-10-15 NOTE — LACTATION NOTE
"This note was copied from a baby's chart.  D: I met with Maya; she had [place] at breast.  I:  We discussed supportive hold, positioning, latch, breastfeeding patterns and infant driven feeding, breast support and compressions, skin to skin benefits, and timing of pumpings around breastfeedings.  Despite different holds, encouragement, gtts of colostrum, etc, he did not wake up in cue in 20\".  We talked about importance of good time management, to allow for time/ energy to finger feed and pump/ hand express given his sleepiness and low glucoses.  We talked about pumping at bedside right after nursing and ideal timing.  A:  Dellae sleepy at breast; review of ideal positioning and pumping regime.  P:  Will continue to provide lactation support.    Paulette Delgado, RNC, IBCLC    "

## 2018-10-16 VITALS
RESPIRATION RATE: 16 BRPM | HEART RATE: 71 BPM | TEMPERATURE: 98.2 F | DIASTOLIC BLOOD PRESSURE: 80 MMHG | OXYGEN SATURATION: 100 % | SYSTOLIC BLOOD PRESSURE: 124 MMHG

## 2018-10-16 LAB
ALT SERPL W P-5'-P-CCNC: 26 U/L (ref 0–50)
AST SERPL W P-5'-P-CCNC: 28 U/L (ref 0–45)
CREAT SERPL-MCNC: 0.69 MG/DL (ref 0.52–1.04)
ERYTHROCYTE [DISTWIDTH] IN BLOOD BY AUTOMATED COUNT: 14.3 % (ref 10–15)
GFR SERPL CREATININE-BSD FRML MDRD: >90 ML/MIN/1.7M2
HCT VFR BLD AUTO: 31.3 % (ref 35–47)
HGB BLD-MCNC: 9.8 G/DL (ref 11.7–15.7)
MCH RBC QN AUTO: 26.8 PG (ref 26.5–33)
MCHC RBC AUTO-ENTMCNC: 31.3 G/DL (ref 31.5–36.5)
MCV RBC AUTO: 86 FL (ref 78–100)
PLATELET # BLD AUTO: 266 10E9/L (ref 150–450)
RBC # BLD AUTO: 3.65 10E12/L (ref 3.8–5.2)
WBC # BLD AUTO: 12.1 10E9/L (ref 4–11)

## 2018-10-16 PROCEDURE — 84460 ALANINE AMINO (ALT) (SGPT): CPT | Performed by: STUDENT IN AN ORGANIZED HEALTH CARE EDUCATION/TRAINING PROGRAM

## 2018-10-16 PROCEDURE — 85027 COMPLETE CBC AUTOMATED: CPT | Performed by: STUDENT IN AN ORGANIZED HEALTH CARE EDUCATION/TRAINING PROGRAM

## 2018-10-16 PROCEDURE — 84450 TRANSFERASE (AST) (SGOT): CPT | Performed by: STUDENT IN AN ORGANIZED HEALTH CARE EDUCATION/TRAINING PROGRAM

## 2018-10-16 PROCEDURE — 25000132 ZZH RX MED GY IP 250 OP 250 PS 637: Performed by: STUDENT IN AN ORGANIZED HEALTH CARE EDUCATION/TRAINING PROGRAM

## 2018-10-16 PROCEDURE — 82565 ASSAY OF CREATININE: CPT | Performed by: STUDENT IN AN ORGANIZED HEALTH CARE EDUCATION/TRAINING PROGRAM

## 2018-10-16 PROCEDURE — 36415 COLL VENOUS BLD VENIPUNCTURE: CPT | Performed by: STUDENT IN AN ORGANIZED HEALTH CARE EDUCATION/TRAINING PROGRAM

## 2018-10-16 RX ORDER — FERROUS GLUCONATE 324(38)MG
324 TABLET ORAL
Qty: 60 TABLET | Refills: 0 | Status: SHIPPED | OUTPATIENT
Start: 2018-10-16

## 2018-10-16 RX ADMIN — ACETAMINOPHEN 975 MG: 325 TABLET, FILM COATED ORAL at 12:06

## 2018-10-16 RX ADMIN — IBUPROFEN 800 MG: 800 TABLET ORAL at 00:15

## 2018-10-16 RX ADMIN — ACETAMINOPHEN 975 MG: 325 TABLET, FILM COATED ORAL at 04:18

## 2018-10-16 RX ADMIN — IBUPROFEN 800 MG: 800 TABLET ORAL at 12:06

## 2018-10-16 RX ADMIN — IBUPROFEN 800 MG: 800 TABLET ORAL at 06:33

## 2018-10-16 NOTE — PLAN OF CARE
Problem: Patient Care Overview  Goal: Plan of Care/Patient Progress Review  Outcome: Improving  Patient did well throughout the night, was able to sleep and stated that her pain control is adequate. See flow sheets for slightly elevated blood pressure. Patient stated that she has been feeling more anxiety about her baby being in the NICU, and that she had cried last night. She went to NICU at 0430 to feed baby. She was tearful, but hopeful that baby might be able to go home today. Will continue to monitor her and her blood pressure.

## 2018-10-16 NOTE — PLAN OF CARE
Problem: Patient Care Overview  Goal: Plan of Care/Patient Progress Review  Outcome: Therapy, progress toward functional goals as expected  VSS, postpartum assessments WNL. She is up ad amauri, voiding WNL, having regular bowel movements, tolerating regular diet. Taking ibuprofen and tylenol for incisional pain. Her blood pressures today are WNL, and she denies headache, vision changes, RUQ pain, shortness of breath. On breast assessment it was noted that both breasts are firm, full, engorged. This writer advised Pt how to use ice packs, hand expression, warm packs, to relieve discomfort. NICU lactation is also following and will follow up with Pt in the NICU after discharge. She has read through her discharge instructions. Plan to discharge later today after provider assesses Pt, she has a NICU boarding room ready.

## 2018-10-16 NOTE — PLAN OF CARE
Problem: Patient Care Overview  Goal: Plan of Care/Patient Progress Review  Outcome: Improving  Data: Vital signs within normal limits. Postpartum checks within normal limits - see flow record. Patient eating and drinking normally. Patient able to empty bladder independently and is up ambulating. No apparent signs of infection. Incision healing well. Patient performing self cares. Patient breast pumping and going down to NICU to breastfeed baby boy, [place].   Action: Pain has been adequately managed with Tylenol and Ibuprofen. Patient also wearing abdominal binder for support and comfort.   Response: Patient has been going down to NICU frequently to breastfeed baby boy. Support person, Sergei, present.   Plan: Continue with the plan of care.

## 2018-10-16 NOTE — PLAN OF CARE
Problem: Patient Care Overview  Goal: Plan of Care/Patient Progress Review  Outcome: Adequate for Discharge Date Met: 10/16/18  Stable. Pain managed well with Ibuprofen and Tylenol. Patient taking care of self independently. Pumping independently. Ambulating to NICU to feed baby. Pt educated on discharge instructions and given written handout, verbalized understanding of instructions. Discharged with medications, patient educated on medications and given written copies as well.  Patient discharged to NICU boarding room.

## 2018-10-16 NOTE — DISCHARGE INSTRUCTIONS
Postop  Birth Instructions    Activity       Do not lift more than 10 pounds for 6 weeks after surgery.  Ask family and friends for help when you need it.    No driving until you have stopped taking your pain medications (usually two weeks after surgery).    No heavy exercise or activity for 6 weeks.  Don't do anything that will put a strain on your surgery site.    Don't strain when using the toilet.  Your care team may prescribe a stool softener if you have problems with your bowel movements.     To care for your incision:       Keep the incision clean and dry.    Do not soak your incision in water. No swimming or hot tubs until it has fully healed. You may soak in the bathtub if the water level is below your incision.    Do not use peroxide, gel, cream, lotion, or ointment on your incision.    Adjust your clothes to avoid pressure on your surgery site (check the elastic in your underwear for example).     You may see a small amount of clear or pink drainage and this is normal.  Check with your health care provider:       If the drainage increases or has an odor.    If the incision reddens, you have swelling, or develop a rash.    If you have increased pain and the medicine we prescribed doesn't help.    If you have a fever above 100.4 F (38 C) with or without chills when placing thermometer under your tongue.   The area around your incision (surgery wound), will feel numb.  This is normal. The numbness should go away in less than a year.     Keep your hands clean:  Always wash your hands before touching your incision (surgery wound). This helps reduce your risk of infection. If your hands aren't dirty, you may use an alcohol hand-rub to clean your hands. Keep your nails clean and short.    Call your healthcare provider if you have any of these symptoms:       You soak a sanitary pad with blood within 1 hour, or you see blood clots larger than a golf ball.    Bleeding that lasts more than 6  weeks.    Vaginal discharge that smells bad.    Severe pain, cramping or tenderness in your lower belly area.    A need to urinate more frequently (use the toilet more often), more urgently (use the toilet very quickly), or it burns when you urinate.    Nausea and vomiting.    Redness, swelling or pain around a vein in your leg.    Problems breastfeeding or a red or painful area on your breast.    Chest pain and cough or are gasping for air.    Problems with coping with sadness, anxiety or depression. If you have concerns about hurting yourself or the baby, call your provider immediately.      You have questions or concerns after you return home.     Preeclampsia   Call your doctor right away if you have any of the following:  - Edema (swelling) in your face or hands  - Rapid weight gain-about 1 pound or more in a day  - Headache  - Abdominal pain on your right side  - Vision problems (flashes or spots)  - You have questions or concerns once you return home.

## 2018-10-16 NOTE — PROGRESS NOTES
[facility]   Post-partum Note    Name:  Maya Pedro  MRN: [id number]    S: Patient doing well this morning.  Pain is adequately controlled.  Denies headache, shortness of breath. Tolerating regular diet without nausea or vomiting. Voiding spontaneously.  Reports passing flatus.  Lochia diminishing no minimal. Breast feeding and pumping while baby in NICU.  Plans Depo for contraception at postpartum visit. She would like to board if that is an option.    O:   Patient Vitals for the past 12 hrs:   BP Temp Temp src Pulse Resp   10/16/18 0415 (!) 143/96 - - - -   10/16/18 0000 153/85 98.7  F (37.1  C) Oral 80 16   10/15/18 1816 132/74 98.3  F (36.8  C) Oral 72 16     Gen:  Resting comfortably, NAD  CV:  Regular rate and rhythm  Pulm:  Nonlabored breathing  Abd:  Soft, appropriately ttp, non-distended. Fundus at 2cm below umbilicus, firm and non-tender.  Incision: c/d/i no surrounding redness or erythema, steristrips in place  Ext:  non-tender, no LE edema b/l    Hgb:   Hemoglobin   Date Value Ref Range Status   10/14/2018 10.2 (L) 11.7 - 15.7 g/dL Final     Assessment/Plan:  33 year old  on POD #3 s/p PLTCS for category II fetal heart tracing remote from delivery, with new elevated BPs meeting criteria for postpartum gestation HTN.    gHTN: mild range blood pressures in PACU and again now. HELLP labs wnl POD#0. Repeat HELLP labs this AM  - symptoms: denies  - monitor BP closely, if persistent high mild range may need antihypertensive. If severe will need magnesium sulfate for likely postpartum preE    HSV: No active lesions. Can discontinue acyclovir if desires    Routine postpartum cares:  Pain: Well-controlled with ibuprofen and tylenol. Has not required oxycodone.   Hgb: 10.7>EBL 716cc> 10.2  Rh: pos  Rubella: immune  Feed: Breast/pumping  BC: Depo at postpartum visit  Dispo: Discharge pending BP, may need to stay another night for monitoring    Abby Forde MD  OB/Gyn  PGY-3  10/16/2018 6:47 AM    Vitals:    10/16/18 0000 10/16/18 0415 10/16/18 0820 10/16/18 1442   BP: 153/85 (!) 143/96 120/67 124/80   Pulse: 80  71    Resp: 16  16    Temp: 98.7  F (37.1  C)  98.2  F (36.8  C)    TempSrc: Oral  Oral    SpO2:         Patient seen separately from resident.  Patient doing well without symptoms of preeclampsia and BP now in normal range.  Plan discharge to boarding room tonight.  I agree with the remainder of plan above.  Yolanda De Souza MD

## 2018-10-22 LAB — COPATH REPORT: NORMAL

## 2020-06-04 NOTE — PLAN OF CARE
Problem: Patient Care Overview  Goal: Plan of Care/Patient Progress Review  Outcome: Improving  Data: Vital signs within normal limits. Postpartum checks within normal limits - see flow record. Patient eating and drinking normally. Patient able to empty bladder independently and is up ambulating. No apparent signs of infection. Incision healing well. Patient performing self cares, showered this evening. Breast pumping independently, getting about 5-10mL of breast milk per pumping session.  Action: Pain has been adequately managed with Tylenol and Toradol (last dose given this evening). Patient states abdominal binder helps a lot with pain.  Response: Patient and FOB have been to NICU to visit baby. Support person, FOB: Sergei, present.   Plan: Continue with the plan of care.        Hide Include Location In Plan Question?: No Detail Level: Generalized

## 2020-11-24 ENCOUNTER — TRANSFERRED RECORDS (OUTPATIENT)
Dept: HEALTH INFORMATION MANAGEMENT | Facility: CLINIC | Age: 35
End: 2020-11-24

## 2020-12-02 ENCOUNTER — MEDICAL CORRESPONDENCE (OUTPATIENT)
Dept: HEALTH INFORMATION MANAGEMENT | Facility: CLINIC | Age: 35
End: 2020-12-02

## 2020-12-03 ENCOUNTER — TRANSCRIBE ORDERS (OUTPATIENT)
Dept: MATERNAL FETAL MEDICINE | Facility: CLINIC | Age: 35
End: 2020-12-03

## 2020-12-03 DIAGNOSIS — O26.90 PREGNANCY RELATED CONDITION, ANTEPARTUM: Primary | ICD-10-CM

## 2020-12-21 ENCOUNTER — PRE VISIT (OUTPATIENT)
Dept: MATERNAL FETAL MEDICINE | Facility: CLINIC | Age: 35
End: 2020-12-21

## 2020-12-23 ENCOUNTER — HOSPITAL ENCOUNTER (OUTPATIENT)
Dept: ULTRASOUND IMAGING | Facility: CLINIC | Age: 35
End: 2020-12-23
Attending: MIDWIFE
Payer: COMMERCIAL

## 2020-12-23 ENCOUNTER — OFFICE VISIT (OUTPATIENT)
Dept: MATERNAL FETAL MEDICINE | Facility: CLINIC | Age: 35
End: 2020-12-23
Attending: MIDWIFE
Payer: COMMERCIAL

## 2020-12-23 DIAGNOSIS — O26.90 PREGNANCY RELATED CONDITION, ANTEPARTUM: ICD-10-CM

## 2020-12-23 DIAGNOSIS — O09.522 MULTIGRAVIDA OF ADVANCED MATERNAL AGE IN SECOND TRIMESTER: Primary | ICD-10-CM

## 2020-12-23 DIAGNOSIS — O09.521 SUPERVISION OF ELDERLY MULTIGRAVIDA IN FIRST TRIMESTER: ICD-10-CM

## 2020-12-23 DIAGNOSIS — O09.521 SUPERVISION OF ELDERLY MULTIGRAVIDA IN FIRST TRIMESTER: Primary | ICD-10-CM

## 2020-12-23 PROCEDURE — 96040 HC GENETIC COUNSELING, EACH 30 MINUTES: CPT | Performed by: GENETIC COUNSELOR, MS

## 2020-12-23 PROCEDURE — 76813 OB US NUCHAL MEAS 1 GEST: CPT | Mod: 26 | Performed by: OBSTETRICS & GYNECOLOGY

## 2020-12-23 PROCEDURE — 76813 OB US NUCHAL MEAS 1 GEST: CPT

## 2020-12-23 PROCEDURE — 36415 COLL VENOUS BLD VENIPUNCTURE: CPT | Performed by: OBSTETRICS & GYNECOLOGY

## 2020-12-23 PROCEDURE — 999N001100 HC STATISTIC VERIFI PRENATAL TRISOMY 21,18,13: Performed by: OBSTETRICS & GYNECOLOGY

## 2020-12-23 NOTE — PROGRESS NOTES
"Please see \"Imaging\" tab under \"Chart Review\" for details of today's visit.    Ramandeep Jones MD PhD  Maternal Fetal Medicine     "

## 2020-12-23 NOTE — PROGRESS NOTES
[facility]  Genetic Counseling Consult    Patient: Maya Pedro YOB: 1985   Date of Service: 20      Maya Pedro was seen at [facility] [place] for genetic consultation to discuss the options for screening and testing for fetal chromosome abnormalities.  The indication for genetic counseling is advanced maternal age.        Impression/Plan:   1.  Maya had an ultrasound and blood draw for NIPT (Innatal test through [employer]).  Results are expected within 5-7 days, and will be available in [employer].  We will contact her to discuss the results, and a copy will be forwarded to the office of the referring OB provider.  Maya will be contacted at the phone number she provided, [phone], and requests that detailed results, including fetal sex indicated by testing, be left in her voicemail if she cannot be reached.  Maya requests that results be discussed with her prior to being released to [employer].    2.  Maternal serum AFP (single marker screen) is recommended after 15 weeks to screen for open neural tube defects. A quad screen should not be performed.    3.  An 18-20 week comprehensive ultrasound is standard of care for all women 35 or older at delivery.    Pregnancy History:   /Parity:    Age at Delivery: 35 year old  NORM: 2021, by Ultrasound  Gestational Age: 12w2d    No significant complications or exposures were reported in the current pregnancy.    Maya gant pregnancy history is significant for 1 prior full term pregnancy with no reported complications.    Medical History:   Maya gant reported medical history is not expected to impact pregnancy management or risks to fetal development.       Family History:   A three-generation pedigree was obtained, and is scanned under the  Media  tab.   The following significant findings were reported by Maya:    Maya's  has a niece with cognitive impairment  without a known diagnosis.  Per report she is otherwise healthy, but requires specialized assistance in school.  We discussed that there can be a wide range of different causes for cognitive impairment, including genetic disorders.  However, without knowing a specific cause for her niece's differences, a more precise risk estimate is not possible.      Otherwise, the reported family history is negative for multiple miscarriages, stillbirths, birth defects, cognitive impairment, known genetic conditions, and consanguinity.       Carrier Screening:   The patient reports that she and the father of the pregnancy have  ancestry:      We reviewed the clinical features, autosomal recessive inheritance, and options for carrier screening and  screening for hemoglobinopathies.      Expanded carrier screening for mutations in a large panel of genes associated with autosomal recessive conditions including cystic fibrosis, spinal muscular atrophy, and others, is now available.      The patient has declined the carrier screening options reviewed today.       Risk Assessment for Chromosome Conditions:   We explained that the risk for fetal chromosome abnormalities increases with maternal age. We discussed specific features of common chromosome abnormalities, including Down syndrome, trisomy 13, trisomy 18, and sex chromosome trisomies.      - At age 35 at midtrimester, the risk to have a baby with Down syndrome is 1 in 216.     - At age 35 at midtrimester, the risk to have a baby with any chromosome abnormality is 1 in 105.          Testing Options:   We discussed the following options:   Non-invasive Prenatal Testing (NIPT)    Maternal plasma cell-free DNA testing; first trimester ultrasound with nuchal translucency and nasal bone assessment is recommended, when appropriate    Screens for fetal trisomy 21, trisomy 13, trisomy 18, and sex chromosome aneuploidy    Cannot screen for open neural tube defects;  maternal serum AFP after 15 weeks is recommended       Genetic Amniocentesis    Invasive procedure typically performed in the second trimester by which amniotic fluid is obtained for the purpose of chromosome analysis and/or other prenatal genetic analysis    Diagnostic results; >99% sensitivity for fetal chromosome abnormalities    AFAFP measurement tests for open neural tube defects       Comprehensive (Level II) ultrasound: Detailed ultrasound performed between 18-22 weeks gestation to screen for major birth defects and markers for aneuploidy.        We reviewed the benefits and limitations of this testing.  Screening tests provide a risk assessment specific to the pregnancy for certain fetal chromosome abnormalities, but cannot definitively diagnose or exclude a fetal chromosome abnormality.  Follow-up genetic counseling and consideration of diagnostic testing is recommended with any abnormal screening result.     Diagnostic tests carry inherent risks- including risk of miscarriage- that require careful consideration.  These tests can detect fetal chromosome abnormalities with greater than 99% certainty.  Results can be compromised by maternal cell contamination or mosaicism, and are limited by the resolution of cytogenetic G-banding technology.  There is no screening nor diagnostic test that can detect all forms of birth defects or mental disability.     It was a pleasure to be involved with Maya s care. Face-to-face time of the meeting was 35 minutes.      Beni Meza MS, [facility]  Licensed Genetic Counselor  Phone: [phone]  Pager: [phone]

## 2020-12-30 ENCOUNTER — TELEPHONE (OUTPATIENT)
Dept: MATERNAL FETAL MEDICINE | Facility: CLINIC | Age: 35
End: 2020-12-30
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
## 2021-01-08 ENCOUNTER — TELEPHONE (OUTPATIENT)
Dept: MATERNAL FETAL MEDICINE | Facility: CLINIC | Age: 36
End: 2021-01-08

## 2021-01-08 ENCOUNTER — TRANSFERRED RECORDS (OUTPATIENT)
Dept: HEALTH INFORMATION MANAGEMENT | Facility: CLINIC | Age: 36
End: 2021-01-08

## 2021-01-08 ENCOUNTER — MEDICAL CORRESPONDENCE (OUTPATIENT)
Dept: HEALTH INFORMATION MANAGEMENT | Facility: CLINIC | Age: 36
End: 2021-01-08

## 2021-01-08 LAB — LAB SCANNED RESULT: NORMAL

## 2021-01-08 NOTE — TELEPHONE ENCOUNTER
1/8/2021       Called Maya to discuss NIPT results.  Results came back negative for chromosome abnormalities in chromosomes 21, 18, & 13, as well as the sex chromosomes.  These test results do not definitively rule out the possibility of one of these conditions, but they do greatly reduce the likelihood.  The test identified sex chromosomes consistent with female sex (XX).  Maya had no questions at this time and was encouraged to reach out if she has any questions or concerns in the future.       Beni Meza MS, [facility]  Licensed Genetic Counselor  Phone: [phone]  Pager: [phone]

## 2021-01-11 ENCOUNTER — TRANSCRIBE ORDERS (OUTPATIENT)
Dept: MATERNAL FETAL MEDICINE | Facility: CLINIC | Age: 36
End: 2021-01-11

## 2021-01-11 DIAGNOSIS — O26.90 PREGNANCY RELATED CONDITION, ANTEPARTUM: Primary | ICD-10-CM

## 2021-02-12 ENCOUNTER — OFFICE VISIT (OUTPATIENT)
Dept: MATERNAL FETAL MEDICINE | Facility: CLINIC | Age: 36
End: 2021-02-12
Attending: MIDWIFE
Payer: COMMERCIAL

## 2021-02-12 ENCOUNTER — HOSPITAL ENCOUNTER (OUTPATIENT)
Dept: ULTRASOUND IMAGING | Facility: CLINIC | Age: 36
End: 2021-02-12
Attending: MIDWIFE
Payer: COMMERCIAL

## 2021-02-12 DIAGNOSIS — O26.90 PREGNANCY RELATED CONDITION, ANTEPARTUM: Primary | ICD-10-CM

## 2021-02-12 DIAGNOSIS — O26.90 PREGNANCY RELATED CONDITION, ANTEPARTUM: ICD-10-CM

## 2021-02-12 PROCEDURE — 76811 OB US DETAILED SNGL FETUS: CPT

## 2021-02-12 PROCEDURE — 76811 OB US DETAILED SNGL FETUS: CPT | Mod: 26 | Performed by: OBSTETRICS & GYNECOLOGY

## 2021-04-09 NOTE — PLAN OF CARE
"Problem: Patient Care Overview  Goal: Plan of Care/Patient Progress Review  Outcome: Improving  VSS. Postpartum assessment WNL. Joshua in place and patent with adequate output. Plan is for joshua to come out this morning after pt is able to ambulate. Pt too exhausted overnight and reports \"leg heaviness\" postop. Education provided on early ambulation and mobility. Pneumoboots in place. Pain managed with tylenol and toradol. Pt is aware oxycodone is available as needed. TAPs block was received; pt wishes to avoid narcotics if possible; education provided on pain management and pain management strategies discussed. Pumping for  in NICU. Spouse Sergei present at bedside and supportive. No further concerns at this time.       " What Type Of Note Output Would You Prefer (Optional)?: Bullet Format Hpi Title: Evaluation of Skin Lesions How Severe Are Your Spot(S)?: mild Have Your Spot(S) Been Treated In The Past?: has been treated Year Removed: 2020

## 2021-05-27 ENCOUNTER — OFFICE VISIT (OUTPATIENT)
Dept: OBGYN | Facility: CLINIC | Age: 36
End: 2021-05-27
Payer: COMMERCIAL

## 2021-05-27 ENCOUNTER — PREP FOR PROCEDURE (OUTPATIENT)
Dept: OBGYN | Facility: CLINIC | Age: 36
End: 2021-05-27

## 2021-05-27 VITALS
BODY MASS INDEX: 27.32 KG/M2 | DIASTOLIC BLOOD PRESSURE: 72 MMHG | HEART RATE: 66 BPM | SYSTOLIC BLOOD PRESSURE: 112 MMHG | WEIGHT: 154.2 LBS | HEIGHT: 63 IN

## 2021-05-27 DIAGNOSIS — O34.219 HISTORY OF CESAREAN DELIVERY, CURRENTLY PREGNANT: Primary | ICD-10-CM

## 2021-05-27 DIAGNOSIS — O34.219 PREVIOUS CESAREAN DELIVERY, ANTEPARTUM CONDITION OR COMPLICATION: Primary | ICD-10-CM

## 2021-05-27 DIAGNOSIS — O34.219 DECLINES VBAC (VAGINAL BIRTH AFTER CESAREAN) TRIAL: ICD-10-CM

## 2021-05-27 PROCEDURE — G0463 HOSPITAL OUTPT CLINIC VISIT: HCPCS

## 2021-05-27 PROCEDURE — 99213 OFFICE O/P EST LOW 20 MIN: CPT | Mod: GE | Performed by: OBSTETRICS & GYNECOLOGY

## 2021-05-27 RX ORDER — CEFAZOLIN SODIUM 2 G/100ML
2 INJECTION, SOLUTION INTRAVENOUS
Status: CANCELLED | OUTPATIENT
Start: 2021-05-27

## 2021-05-27 RX ORDER — SODIUM CHLORIDE, SODIUM LACTATE, POTASSIUM CHLORIDE, CALCIUM CHLORIDE 600; 310; 30; 20 MG/100ML; MG/100ML; MG/100ML; MG/100ML
INJECTION, SOLUTION INTRAVENOUS CONTINUOUS
Status: CANCELLED | OUTPATIENT
Start: 2021-05-27

## 2021-05-27 RX ORDER — LIDOCAINE 40 MG/G
CREAM TOPICAL
Status: CANCELLED | OUTPATIENT
Start: 2021-05-27

## 2021-05-27 RX ORDER — CITRIC ACID/SODIUM CITRATE 334-500MG
30 SOLUTION, ORAL ORAL
Status: CANCELLED | OUTPATIENT
Start: 2021-05-27

## 2021-05-27 RX ORDER — CEFAZOLIN SODIUM 1 G/3ML
1 INJECTION, POWDER, FOR SOLUTION INTRAMUSCULAR; INTRAVENOUS SEE ADMIN INSTRUCTIONS
Status: CANCELLED | OUTPATIENT
Start: 2021-05-27

## 2021-05-27 ASSESSMENT — ANXIETY QUESTIONNAIRES
2. NOT BEING ABLE TO STOP OR CONTROL WORRYING: NOT AT ALL
1. FEELING NERVOUS, ANXIOUS, OR ON EDGE: NOT AT ALL
3. WORRYING TOO MUCH ABOUT DIFFERENT THINGS: NOT AT ALL

## 2021-05-27 ASSESSMENT — PATIENT HEALTH QUESTIONNAIRE - PHQ9: 5. POOR APPETITE OR OVEREATING: NOT AT ALL

## 2021-05-27 ASSESSMENT — MIFFLIN-ST. JEOR: SCORE: 1358.58

## 2021-05-27 NOTE — PROGRESS NOTES
[facility]      RETURN OBSTETRIC VISIT      Subjective: Maya Westonis a 35 year old [id number] 34w3d by 9w2d US who presents for TOLAC consult.     Patient states last  section was due to fetal intolerance. She was initially induced for PROM and developed Cat II tracing RFD, so CS was recommended at that time. Patient desires a repeat  section with bilateral salpingectomy. Patient favors salpingectomy over tubal ligation.    Patient has a history of volvulus correction surgery when she ewas 2 years old.     Denies vaginal bleeding, loss of fluid, contractions, decreased fetal movement.    Pregnancy complicated by:  - HSV    OB History    Para Term  AB Living   3 1 1 0 1 0   SAB TAB Ectopic Multiple Live Births   0 0 0 0 0      # Outcome Date GA Lbr Dany/2nd Weight Sex Delivery Anes PTL Lv   3 Current            2 Term 10/13/18 38w0d  2.26 kg (4 lb 15.7 oz) M CS-LTranv         Complications: Fetal Intolerance      Name: MATT WESTON      Apgar1: 7  Apgar5: 9   1 AB                No past medical history on file.    Past Surgical History:   Procedure Laterality Date      SECTION N/A 10/13/2018    Procedure:  SECTION;;  Surgeon: Yolanda Lozano MD;  Location: [facility] L+D     VOLVULUS REDUCTION         Social History     Tobacco Use     Smoking status: Never Smoker     Smokeless tobacco: Never Used   Substance Use Topics     Alcohol use: No     Drug use: No       No family history on file.    Current Outpatient Medications   Medication     ferrous gluconate (FERGON) 324 (38 Fe) MG tablet     Prenatal Vit-Fe Fumarate-FA (PRENATAL MULTIVITAMIN PLUS IRON) 27-0.8 MG TABS per tablet     VITAMIN D, CHOLECALCIFEROL, PO     ACYCLOVIR PO     Docusate Sodium (COLACE PO)     Ferrous Sulfate (IRON SUPPLEMENT PO)     ibuprofen (ADVIL/MOTRIN) 800 MG tablet     oxyCODONE IR (ROXICODONE) 5 MG tablet     senna-docusate (SENOKOT-S;PERICOLACE) 8.6-50 MG per tablet  "    No current facility-administered medications for this visit.         No Known Allergies    Objective:    /72 (BP Location: Left arm, Patient Position: Chair)   Pulse 66   Ht 1.6 m (5' 3\")   Wt 69.9 kg (154 lb 3.2 oz)   LMP 10/05/2020   BMI 27.32 kg/m      Wt Readings from Last 2 Encounters:   21 69.9 kg (154 lb 3.2 oz)       Physical exam:  General: Well, no acute distress  Cardiac and lung: No increased work of breathing    Assessment and Plan:  Maya Pedro is a 35 year old woman at 34w3d by 9w2d US who is here for a TOLAC Consult.    TOLAC Consult:   - Patient elected to undergo repeat  section with bilateral salpingectomy. We discussed risks of CS including bleeding, infection, and damage to surrounding structures of the uterus including bowel, bladder and baby. We discussed benefits of RCS in this case to be avoidance of any concerns for failed TOLAC or uterine rupture.  We discussed that having a scheduled CS carries less risks that a CS completed with labor. At the end of our discussion today, the patient chooses to schedule repeat .    - Discussed that if patient enters spontaneous labor prior to scheduled section, she should come to OB triage.  - CS orders placed today.      Return to OB triage at 39weeks for scheduled repeat  section. Discussed return precautions.    Staffed with Dr. Shearer.     Saad Dickerson MD  Obstetrics, Gynecology, and Women's Health  PGY1  May 27, [id number] PM      The patient was seen in resident continuity clinic by Dr. Dickerson.  I have reviewed the history and exam, the assessment and plan were jointly made.     Alta Shearer MD, FACOG                "

## 2021-05-28 ENCOUNTER — TELEPHONE (OUTPATIENT)
Dept: OBGYN | Facility: CLINIC | Age: 36
End: 2021-05-28

## 2021-05-28 NOTE — TELEPHONE ENCOUNTER
Confirmed c/section date, time and location, 6/28/21, arrival time at 8:30a.m with nothing to eat eight hours before scheduled c/section time, COVID testing 96 hours prior, c/section map and letter mailed out.     to complete the following fields:            CHECKLIST     Google Calendar : Yes     Resident notified Not Applicable     Clinic schedule blocked: Not Applicable    Patient notified:Yes      Pre op information sent: Yes     Given to patient over the phone.Yes    Comments:

## 2021-05-31 DIAGNOSIS — Z11.59 ENCOUNTER FOR SCREENING FOR OTHER VIRAL DISEASES: ICD-10-CM

## 2021-06-24 DIAGNOSIS — Z11.59 ENCOUNTER FOR SCREENING FOR OTHER VIRAL DISEASES: ICD-10-CM

## 2021-06-24 LAB
LABORATORY COMMENT REPORT: NORMAL
SARS-COV-2 RNA RESP QL NAA+PROBE: NEGATIVE
SARS-COV-2 RNA RESP QL NAA+PROBE: NORMAL
SPECIMEN SOURCE: NORMAL
SPECIMEN SOURCE: NORMAL

## 2021-06-24 PROCEDURE — U0005 INFEC AGEN DETEC AMPLI PROBE: HCPCS | Performed by: OBSTETRICS & GYNECOLOGY

## 2021-06-24 PROCEDURE — U0003 INFECTIOUS AGENT DETECTION BY NUCLEIC ACID (DNA OR RNA); SEVERE ACUTE RESPIRATORY SYNDROME CORONAVIRUS 2 (SARS-COV-2) (CORONAVIRUS DISEASE [COVID-19]), AMPLIFIED PROBE TECHNIQUE, MAKING USE OF HIGH THROUGHPUT TECHNOLOGIES AS DESCRIBED BY CMS-2020-01-R: HCPCS | Performed by: OBSTETRICS & GYNECOLOGY

## 2021-06-25 NOTE — H&P
[facility]  OB History and Physical      Maya Weston MRN# [id number]   Age: 35 year old YOB: 1985     CC:  Scheduled repeat CS    HPI:  Ms. Maya Weston is a 35 year old  at 39w0d by 9w2d US who presents for scheduled repeat CS and bilateral salpingectomy. She endorses some contractions, but denies vaginal bleeding, and loss of fluid.   Endorses normal fetal movement.    Pregnancy Complications:  - H/o CS x1 for Cat II FHT remote from delivery  - H/o volvulus correction surgery at 2 yo  - HSV    Prenatal Labs:   Lab Results   Component Value Date    ABO A 10/12/2018    ABO A 10/12/2018    RH Pos 10/12/2018    RH Pos 10/12/2018    AS Neg 10/12/2018    HEPBANG Negative 2018    HGB 10.9 (L) 2021     OB History  OB History    Para Term  AB Living   3 1 1 0 1 1   SAB TAB Ectopic Multiple Live Births   0 0 0 0 1      # Outcome Date GA Lbr Dany/2nd Weight Sex Delivery Anes PTL Lv   3 Current            2 Term 10/13/18 38w0d  2.26 kg (4 lb 15.7 oz) M CS-LTranv   LIVE BIRTH      Complications: Fetal Intolerance      Name: MATT WESTON      Apgar1: 7  Apgar5: 9   1 AB                PMHx:  History reviewed. No pertinent past medical history.    PSHx:   Past Surgical History:   Procedure Laterality Date      SECTION N/A 10/13/2018    Procedure:  SECTION;;  Surgeon: Yolanda Lozano MD;  Location:  L+D     VOLVULUS REDUCTION         Meds:   Medications Prior to Admission   Medication Sig Dispense Refill Last Dose     ACYCLOVIR PO Take 1 g by mouth daily         Docusate Sodium (COLACE PO) Take 100 mg by mouth        ferrous gluconate (FERGON) 324 (38 Fe) MG tablet Take 1 tablet (324 mg) by mouth daily (with breakfast) 60 tablet 0      Ferrous Sulfate (IRON SUPPLEMENT PO) Take 325 mg by mouth daily         ibuprofen (ADVIL/MOTRIN) 800 MG tablet Take 1 tablet (800 mg) by mouth every 6 hours as needed for other  (cramping) (Patient not taking: Reported on 2021) 40 tablet 0      oxyCODONE IR (ROXICODONE) 5 MG tablet Take 1 tablet (5 mg) by mouth every 6 hours as needed for severe pain (Patient not taking: Reported on 2021) 10 tablet 0      Prenatal Vit-Fe Fumarate-FA (PRENATAL MULTIVITAMIN PLUS IRON) 27-0.8 MG TABS per tablet Take 1 tablet by mouth daily        senna-docusate (SENOKOT-S;PERICOLACE) 8.6-50 MG per tablet Take 1 tablet by mouth 2 times daily as needed for constipation (Patient not taking: Reported on 2021) 40 tablet 0      VITAMIN D, CHOLECALCIFEROL, PO Take by mouth daily          Allergies:  No Known Allergies     FmHx: History reviewed. No pertinent family history.    ROS:   Negative except as noted in HPI. She denies headache, blurry vision, chest pain, shortness of breath, RUQ pain, nausea, vomiting, dysuria, hematuria or extremity edema.    PE:  Vit: No data found.     Gen: Well-appearing, NAD, comfortable  CV: Well perfused  Pulm: Breathing comfortably on RA  Abd: Soft, gravid, non-tender  Ext: Trace LE edema b/l       FHT: Baseline 140, mod variability, + accelerations, no decelerations   [place]: 2-3 contractions in 10 minutes      Assessment  Ms. Maya Pedro is a 35 year old , at 39w0d by 9w2d US admitted for scheduled repeat CS. Pregnancy notable for h/o CS x1 and HSV.    Plan  #Scheduled Repeat  Section  # Bilateral tubal sterilization  - Labs: CBC, T&S, RPR  - Placenta: anterior  - Anesthesia: Spinal  - 2g Ancef   - PPH Meds/Ppx: No contraindications to uterotonics  - Diet: NPO  - PPx: SCDs  - Consent: Discussed risks and benefits of procedure, including but not limited to bleeding, infection, injury to surrounding organs, injury to infant, and the potential need for another surgery should some injury go unrecognized or patient were to have continued bleeding. Patient had time to ask questions and expressed understanding of procedure and associated risks. Agreed  to blood transfusion if necessary. Consent signed.    #Satisfied Parity - Desires bilateral tubal sterilization  -her federal consent for sterilization was received from her clinic and noted to be appropriately signed    # Fetal Well Being  - Category I FHT. Reactive and reassuring.  - Continue EFM and [place]    The patient was discussed with Dr. Shearer who is in agreement with the treatment plan.    Ofelia Boudreaux MD  OB/GYN, PGY-2  06/28/2021, 10:36 AM     The patient was seen and evaluated by me separately from the team.  I have reviewed and agree with the above assessment and plan of care.    Alta Shearer MD, FACOG

## 2021-06-27 ENCOUNTER — ANESTHESIA EVENT (OUTPATIENT)
Dept: OBGYN | Facility: CLINIC | Age: 36
End: 2021-06-27
Payer: COMMERCIAL

## 2021-06-27 NOTE — ANESTHESIA PREPROCEDURE EVALUATION
Anesthesia Pre-Procedure Evaluation    Patient: Maya Pedro   MRN: [id number] : 1985        Preoperative Diagnosis: Previous  delivery, antepartum condition or complication [O34.219]   Procedure : Procedure(s):   SECTION     No past medical history on file.   Past Surgical History:   Procedure Laterality Date      SECTION N/A 10/13/2018    Procedure:  SECTION;;  Surgeon: Yolanda Lozano MD;  Location: [facility] L+D     VOLVULUS REDUCTION        No Known Allergies   Social History     Tobacco Use     Smoking status: Never Smoker     Smokeless tobacco: Never Used   Substance Use Topics     Alcohol use: No      Wt Readings from Last 1 Encounters:   21 69.9 kg (154 lb 3.2 oz)        Anesthesia Evaluation            ROS/MED HX  ENT/Pulmonary:  - neg pulmonary ROS     Neurologic:  - neg neurologic ROS     Cardiovascular:     (+) hypertension-----    METS/Exercise Tolerance:     Hematologic:     (+) anemia,     Musculoskeletal:  - neg musculoskeletal ROS     GI/Hepatic:  - neg GI/hepatic ROS     Renal/Genitourinary:  - neg Renal ROS     Endo:  - neg endo ROS     Psychiatric/Substance Use:  - neg psychiatric ROS     Infectious Disease:  - neg infectious disease ROS     Malignancy:  - neg malignancy ROS     Other: Comment:   gHTN     (+) Possibly pregnant, ,         Physical Exam    Airway        Mallampati: II   TM distance: > 3 FB   Neck ROM: full   Mouth opening: > 3 cm    Respiratory Devices and Support         Dental           Cardiovascular   cardiovascular exam normal       Rhythm and rate: regular and normal     Pulmonary   pulmonary exam normal        breath sounds clear to auscultation           OUTSIDE LABS:  CBC:   Lab Results   Component Value Date    WBC 12.1 (H) 10/16/2018    WBC 20.8 (H) 10/13/2018    HGB 9.8 (L) 10/16/2018    HGB 10.2 (L) 10/14/2018    HCT 31.3 (L) 10/16/2018    HCT 32.1 (L) 10/13/2018     10/16/2018     10/13/2018      BMP:   Lab Results   Component Value Date    CR 0.69 10/16/2018    CR 0.68 10/13/2018     COAGS: No results found for: PTT, INR, FIBR  POC: No results found for: BGM, HCG, HCGS  HEPATIC:   Lab Results   Component Value Date    ALT 26 10/16/2018    AST 28 10/16/2018     OTHER: No results found for: PH, LACT, A1C, SAMIRA, PHOS, MAG, LIPASE, AMYLASE, TSH, T4, T3, CRP, SED    Anesthesia Plan    ASA Status:  2   NPO Status:  NPO Appropriate    Anesthesia Type: Spinal.   Induction: N/a.   Maintenance: N/A.   Techniques and Equipment:       - Blood: PRBC     Consents    Anesthesia Plan(s) and associated risks, benefits, and realistic alternatives discussed. Questions answered and patient/representative(s) expressed understanding.     - Discussed with:  Patient      - Extended Intubation/Ventilatory Support Discussed: No.      - Patient is DNR/DNI Status: No    Use of blood products discussed: Yes.     - Discussed with: Patient.     - Consented: consented to blood products            Reason for refusal: other.     Postoperative Care    Pain management: Multi-modal analgesia.   PONV prophylaxis: Ondansetron (or other 5HT-3)     Comments:                Dominic Tong MD

## 2021-06-28 ENCOUNTER — HOSPITAL ENCOUNTER (INPATIENT)
Facility: CLINIC | Age: 36
LOS: 2 days | Discharge: HOME HEALTH | End: 2021-06-30
Attending: OBSTETRICS & GYNECOLOGY | Admitting: OBSTETRICS & GYNECOLOGY
Payer: COMMERCIAL

## 2021-06-28 ENCOUNTER — ANCILLARY PROCEDURE (OUTPATIENT)
Dept: ULTRASOUND IMAGING | Facility: CLINIC | Age: 36
End: 2021-06-28
Attending: ANESTHESIOLOGY
Payer: COMMERCIAL

## 2021-06-28 ENCOUNTER — ANESTHESIA (OUTPATIENT)
Dept: OBGYN | Facility: CLINIC | Age: 36
End: 2021-06-28
Payer: COMMERCIAL

## 2021-06-28 DIAGNOSIS — O34.219 PREVIOUS CESAREAN DELIVERY, ANTEPARTUM CONDITION OR COMPLICATION: ICD-10-CM

## 2021-06-28 DIAGNOSIS — Z98.891 S/P CESAREAN SECTION: Primary | ICD-10-CM

## 2021-06-28 LAB
ABO + RH BLD: NORMAL
ABO + RH BLD: NORMAL
BLD GP AB SCN SERPL QL: NORMAL
BLOOD BANK CMNT PATIENT-IMP: NORMAL
HGB BLD-MCNC: 10.9 G/DL (ref 11.7–15.7)
SPECIMEN EXP DATE BLD: NORMAL
T PALLIDUM AB SER QL: NONREACTIVE

## 2021-06-28 PROCEDURE — 250N000009 HC RX 250: Performed by: STUDENT IN AN ORGANIZED HEALTH CARE EDUCATION/TRAINING PROGRAM

## 2021-06-28 PROCEDURE — 999N000141 HC STATISTIC PRE-PROCEDURE NURSING ASSESSMENT: Performed by: OBSTETRICS & GYNECOLOGY

## 2021-06-28 PROCEDURE — 250N000011 HC RX IP 250 OP 636: Performed by: STUDENT IN AN ORGANIZED HEALTH CARE EDUCATION/TRAINING PROGRAM

## 2021-06-28 PROCEDURE — 58611 LIGATE OVIDUCT(S) ADD-ON: CPT | Mod: GC | Performed by: OBSTETRICS & GYNECOLOGY

## 2021-06-28 PROCEDURE — C9290 INJ, BUPIVACAINE LIPOSOME: HCPCS | Performed by: ANESTHESIOLOGY

## 2021-06-28 PROCEDURE — 0UT70ZZ RESECTION OF BILATERAL FALLOPIAN TUBES, OPEN APPROACH: ICD-10-PCS | Performed by: OBSTETRICS & GYNECOLOGY

## 2021-06-28 PROCEDURE — 120N000002 HC R&B MED SURG/OB UMMC

## 2021-06-28 PROCEDURE — 710N000010 HC RECOVERY PHASE 1, LEVEL 2, PER MIN: Performed by: OBSTETRICS & GYNECOLOGY

## 2021-06-28 PROCEDURE — 86780 TREPONEMA PALLIDUM: CPT | Performed by: OBSTETRICS & GYNECOLOGY

## 2021-06-28 PROCEDURE — 250N000011 HC RX IP 250 OP 636: Performed by: ANESTHESIOLOGY

## 2021-06-28 PROCEDURE — 86900 BLOOD TYPING SEROLOGIC ABO: CPT | Performed by: OBSTETRICS & GYNECOLOGY

## 2021-06-28 PROCEDURE — 36415 COLL VENOUS BLD VENIPUNCTURE: CPT | Performed by: OBSTETRICS & GYNECOLOGY

## 2021-06-28 PROCEDURE — 271N000001 HC OR GENERAL SUPPLY NON-STERILE: Performed by: OBSTETRICS & GYNECOLOGY

## 2021-06-28 PROCEDURE — 272N000001 HC OR GENERAL SUPPLY STERILE: Performed by: OBSTETRICS & GYNECOLOGY

## 2021-06-28 PROCEDURE — 86901 BLOOD TYPING SEROLOGIC RH(D): CPT | Performed by: OBSTETRICS & GYNECOLOGY

## 2021-06-28 PROCEDURE — 258N000003 HC RX IP 258 OP 636: Performed by: STUDENT IN AN ORGANIZED HEALTH CARE EDUCATION/TRAINING PROGRAM

## 2021-06-28 PROCEDURE — 370N000017 HC ANESTHESIA TECHNICAL FEE, PER MIN: Performed by: OBSTETRICS & GYNECOLOGY

## 2021-06-28 PROCEDURE — 85018 HEMOGLOBIN: CPT | Performed by: OBSTETRICS & GYNECOLOGY

## 2021-06-28 PROCEDURE — 59514 CESAREAN DELIVERY ONLY: CPT | Mod: GC | Performed by: OBSTETRICS & GYNECOLOGY

## 2021-06-28 PROCEDURE — 88302 TISSUE EXAM BY PATHOLOGIST: CPT | Mod: TC | Performed by: STUDENT IN AN ORGANIZED HEALTH CARE EDUCATION/TRAINING PROGRAM

## 2021-06-28 PROCEDURE — 360N000076 HC SURGERY LEVEL 3, PER MIN: Performed by: OBSTETRICS & GYNECOLOGY

## 2021-06-28 PROCEDURE — 88302 TISSUE EXAM BY PATHOLOGIST: CPT | Mod: 26 | Performed by: PATHOLOGY

## 2021-06-28 PROCEDURE — 250N000013 HC RX MED GY IP 250 OP 250 PS 637: Performed by: STUDENT IN AN ORGANIZED HEALTH CARE EDUCATION/TRAINING PROGRAM

## 2021-06-28 PROCEDURE — 86850 RBC ANTIBODY SCREEN: CPT | Performed by: OBSTETRICS & GYNECOLOGY

## 2021-06-28 RX ORDER — HYDROCORTISONE 2.5 %
CREAM (GRAM) TOPICAL 3 TIMES DAILY PRN
Status: DISCONTINUED | OUTPATIENT
Start: 2021-06-28 | End: 2021-06-30 | Stop reason: HOSPADM

## 2021-06-28 RX ORDER — NALOXONE HYDROCHLORIDE 0.4 MG/ML
0.2 INJECTION, SOLUTION INTRAMUSCULAR; INTRAVENOUS; SUBCUTANEOUS
Status: DISCONTINUED | OUTPATIENT
Start: 2021-06-28 | End: 2021-06-28

## 2021-06-28 RX ORDER — CARBOPROST TROMETHAMINE 250 UG/ML
250 INJECTION, SOLUTION INTRAMUSCULAR
Status: DISCONTINUED | OUTPATIENT
Start: 2021-06-28 | End: 2021-06-30 | Stop reason: HOSPADM

## 2021-06-28 RX ORDER — LABETALOL HYDROCHLORIDE 5 MG/ML
10 INJECTION, SOLUTION INTRAVENOUS
Status: DISCONTINUED | OUTPATIENT
Start: 2021-06-28 | End: 2021-06-28

## 2021-06-28 RX ORDER — CEFAZOLIN SODIUM 1 G/3ML
1 INJECTION, POWDER, FOR SOLUTION INTRAMUSCULAR; INTRAVENOUS SEE ADMIN INSTRUCTIONS
Status: DISCONTINUED | OUTPATIENT
Start: 2021-06-28 | End: 2021-06-28

## 2021-06-28 RX ORDER — SODIUM CHLORIDE, SODIUM LACTATE, POTASSIUM CHLORIDE, CALCIUM CHLORIDE 600; 310; 30; 20 MG/100ML; MG/100ML; MG/100ML; MG/100ML
INJECTION, SOLUTION INTRAVENOUS
Status: DISCONTINUED
Start: 2021-06-28 | End: 2021-06-28 | Stop reason: HOSPADM

## 2021-06-28 RX ORDER — NALOXONE HYDROCHLORIDE 0.4 MG/ML
0.4 INJECTION, SOLUTION INTRAMUSCULAR; INTRAVENOUS; SUBCUTANEOUS
Status: DISCONTINUED | OUTPATIENT
Start: 2021-06-28 | End: 2021-06-28

## 2021-06-28 RX ORDER — FENTANYL CITRATE 50 UG/ML
15 INJECTION, SOLUTION INTRAMUSCULAR; INTRAVENOUS ONCE
Status: DISCONTINUED | OUTPATIENT
Start: 2021-06-28 | End: 2021-06-28

## 2021-06-28 RX ORDER — LIDOCAINE 40 MG/G
CREAM TOPICAL
Status: DISCONTINUED | OUTPATIENT
Start: 2021-06-28 | End: 2021-06-28

## 2021-06-28 RX ORDER — MORPHINE SULFATE 1 MG/ML
INJECTION, SOLUTION EPIDURAL; INTRATHECAL; INTRAVENOUS
Status: COMPLETED | OUTPATIENT
Start: 2021-06-28 | End: 2021-06-28

## 2021-06-28 RX ORDER — MODIFIED LANOLIN
OINTMENT (GRAM) TOPICAL
Status: DISCONTINUED | OUTPATIENT
Start: 2021-06-28 | End: 2021-06-30 | Stop reason: HOSPADM

## 2021-06-28 RX ORDER — DEXTROSE, SODIUM CHLORIDE, SODIUM LACTATE, POTASSIUM CHLORIDE, AND CALCIUM CHLORIDE 5; .6; .31; .03; .02 G/100ML; G/100ML; G/100ML; G/100ML; G/100ML
INJECTION, SOLUTION INTRAVENOUS CONTINUOUS
Status: DISCONTINUED | OUTPATIENT
Start: 2021-06-28 | End: 2021-06-30 | Stop reason: HOSPADM

## 2021-06-28 RX ORDER — KETOROLAC TROMETHAMINE 30 MG/ML
30 INJECTION, SOLUTION INTRAMUSCULAR; INTRAVENOUS EVERY 6 HOURS
Status: COMPLETED | OUTPATIENT
Start: 2021-06-28 | End: 2021-06-29

## 2021-06-28 RX ORDER — MORPHINE SULFATE 1 MG/ML
150 INJECTION, SOLUTION EPIDURAL; INTRATHECAL; INTRAVENOUS ONCE
Status: DISCONTINUED | OUTPATIENT
Start: 2021-06-28 | End: 2021-06-28

## 2021-06-28 RX ORDER — OXYTOCIN/0.9 % SODIUM CHLORIDE 30/500 ML
100 PLASTIC BAG, INJECTION (ML) INTRAVENOUS CONTINUOUS
Status: DISCONTINUED | OUTPATIENT
Start: 2021-06-28 | End: 2021-06-30 | Stop reason: HOSPADM

## 2021-06-28 RX ORDER — NALOXONE HYDROCHLORIDE 1 MG/ML
0.2 INJECTION INTRAMUSCULAR; INTRAVENOUS; SUBCUTANEOUS
Status: DISCONTINUED | OUTPATIENT
Start: 2021-06-28 | End: 2021-06-30 | Stop reason: HOSPADM

## 2021-06-28 RX ORDER — AMOXICILLIN 250 MG
2 CAPSULE ORAL 2 TIMES DAILY
Status: DISCONTINUED | OUTPATIENT
Start: 2021-06-28 | End: 2021-06-30 | Stop reason: HOSPADM

## 2021-06-28 RX ORDER — ACETAMINOPHEN 325 MG/1
975 TABLET ORAL EVERY 6 HOURS
Status: DISCONTINUED | OUTPATIENT
Start: 2021-06-28 | End: 2021-06-30 | Stop reason: HOSPADM

## 2021-06-28 RX ORDER — LIDOCAINE 40 MG/G
CREAM TOPICAL
Status: DISCONTINUED | OUTPATIENT
Start: 2021-06-28 | End: 2021-06-30 | Stop reason: HOSPADM

## 2021-06-28 RX ORDER — SODIUM CHLORIDE, SODIUM LACTATE, POTASSIUM CHLORIDE, CALCIUM CHLORIDE 600; 310; 30; 20 MG/100ML; MG/100ML; MG/100ML; MG/100ML
INJECTION, SOLUTION INTRAVENOUS CONTINUOUS PRN
Status: DISCONTINUED | OUTPATIENT
Start: 2021-06-28 | End: 2021-06-28

## 2021-06-28 RX ORDER — OXYTOCIN/0.9 % SODIUM CHLORIDE 30/500 ML
340 PLASTIC BAG, INJECTION (ML) INTRAVENOUS CONTINUOUS PRN
Status: DISCONTINUED | OUTPATIENT
Start: 2021-06-28 | End: 2021-06-30 | Stop reason: HOSPADM

## 2021-06-28 RX ORDER — NALOXONE HYDROCHLORIDE 1 MG/ML
0.4 INJECTION INTRAMUSCULAR; INTRAVENOUS; SUBCUTANEOUS
Status: DISCONTINUED | OUTPATIENT
Start: 2021-06-28 | End: 2021-06-30 | Stop reason: HOSPADM

## 2021-06-28 RX ORDER — ONDANSETRON 4 MG/1
4 TABLET, ORALLY DISINTEGRATING ORAL EVERY 30 MIN PRN
Status: DISCONTINUED | OUTPATIENT
Start: 2021-06-28 | End: 2021-06-28

## 2021-06-28 RX ORDER — KETOROLAC TROMETHAMINE 30 MG/ML
30 INJECTION, SOLUTION INTRAMUSCULAR; INTRAVENOUS
Status: DISCONTINUED | OUTPATIENT
Start: 2021-06-28 | End: 2021-06-28

## 2021-06-28 RX ORDER — SIMETHICONE 80 MG
80 TABLET,CHEWABLE ORAL 4 TIMES DAILY PRN
Status: DISCONTINUED | OUTPATIENT
Start: 2021-06-28 | End: 2021-06-30 | Stop reason: HOSPADM

## 2021-06-28 RX ORDER — SODIUM CHLORIDE, SODIUM LACTATE, POTASSIUM CHLORIDE, CALCIUM CHLORIDE 600; 310; 30; 20 MG/100ML; MG/100ML; MG/100ML; MG/100ML
INJECTION, SOLUTION INTRAVENOUS CONTINUOUS
Status: DISCONTINUED | OUTPATIENT
Start: 2021-06-28 | End: 2021-06-28

## 2021-06-28 RX ORDER — OXYTOCIN/0.9 % SODIUM CHLORIDE 30/500 ML
PLASTIC BAG, INJECTION (ML) INTRAVENOUS CONTINUOUS PRN
Status: DISCONTINUED | OUTPATIENT
Start: 2021-06-28 | End: 2021-06-28

## 2021-06-28 RX ORDER — METHYLERGONOVINE MALEATE 0.2 MG/ML
200 INJECTION INTRAVENOUS
Status: DISCONTINUED | OUTPATIENT
Start: 2021-06-28 | End: 2021-06-30 | Stop reason: HOSPADM

## 2021-06-28 RX ORDER — TRANEXAMIC ACID 10 MG/ML
1 INJECTION, SOLUTION INTRAVENOUS EVERY 30 MIN PRN
Status: DISCONTINUED | OUTPATIENT
Start: 2021-06-28 | End: 2021-06-30 | Stop reason: HOSPADM

## 2021-06-28 RX ORDER — MISOPROSTOL 200 UG/1
800 TABLET ORAL
Status: DISCONTINUED | OUTPATIENT
Start: 2021-06-28 | End: 2021-06-30 | Stop reason: HOSPADM

## 2021-06-28 RX ORDER — CITRIC ACID/SODIUM CITRATE 334-500MG
30 SOLUTION, ORAL ORAL
Status: COMPLETED | OUTPATIENT
Start: 2021-06-28 | End: 2021-06-28

## 2021-06-28 RX ORDER — FENTANYL CITRATE-0.9 % NACL/PF 10 MCG/ML
PLASTIC BAG, INJECTION (ML) INTRAVENOUS CONTINUOUS PRN
Status: DISCONTINUED | OUTPATIENT
Start: 2021-06-28 | End: 2021-06-28

## 2021-06-28 RX ORDER — EPHEDRINE SULFATE 50 MG/ML
5 INJECTION, SOLUTION INTRAMUSCULAR; INTRAVENOUS; SUBCUTANEOUS
Status: DISCONTINUED | OUTPATIENT
Start: 2021-06-28 | End: 2021-06-28

## 2021-06-28 RX ORDER — OXYCODONE HYDROCHLORIDE 5 MG/1
5 TABLET ORAL EVERY 4 HOURS PRN
Status: DISCONTINUED | OUTPATIENT
Start: 2021-06-28 | End: 2021-06-30 | Stop reason: HOSPADM

## 2021-06-28 RX ORDER — FENTANYL CITRATE 50 UG/ML
INJECTION, SOLUTION INTRAMUSCULAR; INTRAVENOUS
Status: COMPLETED | OUTPATIENT
Start: 2021-06-28 | End: 2021-06-28

## 2021-06-28 RX ORDER — OXYTOCIN 10 [USP'U]/ML
10 INJECTION, SOLUTION INTRAMUSCULAR; INTRAVENOUS
Status: DISCONTINUED | OUTPATIENT
Start: 2021-06-28 | End: 2021-06-30 | Stop reason: HOSPADM

## 2021-06-28 RX ORDER — IBUPROFEN 800 MG/1
800 TABLET, FILM COATED ORAL EVERY 6 HOURS
Status: DISCONTINUED | OUTPATIENT
Start: 2021-06-29 | End: 2021-06-29

## 2021-06-28 RX ORDER — NALBUPHINE HYDROCHLORIDE 10 MG/ML
2.5-5 INJECTION, SOLUTION INTRAMUSCULAR; INTRAVENOUS; SUBCUTANEOUS EVERY 6 HOURS PRN
Status: DISCONTINUED | OUTPATIENT
Start: 2021-06-28 | End: 2021-06-28

## 2021-06-28 RX ORDER — CEFAZOLIN SODIUM 2 G/100ML
2 INJECTION, SOLUTION INTRAVENOUS
Status: COMPLETED | OUTPATIENT
Start: 2021-06-28 | End: 2021-06-28

## 2021-06-28 RX ORDER — FLUMAZENIL 0.1 MG/ML
0.2 INJECTION, SOLUTION INTRAVENOUS
Status: DISCONTINUED | OUTPATIENT
Start: 2021-06-28 | End: 2021-06-28

## 2021-06-28 RX ORDER — ONDANSETRON 2 MG/ML
4 INJECTION INTRAMUSCULAR; INTRAVENOUS EVERY 30 MIN PRN
Status: DISCONTINUED | OUTPATIENT
Start: 2021-06-28 | End: 2021-06-28

## 2021-06-28 RX ORDER — AMOXICILLIN 250 MG
1 CAPSULE ORAL 2 TIMES DAILY
Status: DISCONTINUED | OUTPATIENT
Start: 2021-06-28 | End: 2021-06-30 | Stop reason: HOSPADM

## 2021-06-28 RX ORDER — BUPIVACAINE HYDROCHLORIDE 2.5 MG/ML
INJECTION, SOLUTION EPIDURAL; INFILTRATION; INTRACAUDAL
Status: COMPLETED | OUTPATIENT
Start: 2021-06-28 | End: 2021-06-28

## 2021-06-28 RX ORDER — BISACODYL 10 MG
10 SUPPOSITORY, RECTAL RECTAL DAILY PRN
Status: DISCONTINUED | OUTPATIENT
Start: 2021-06-30 | End: 2021-06-30 | Stop reason: HOSPADM

## 2021-06-28 RX ORDER — ONDANSETRON 2 MG/ML
4 INJECTION INTRAMUSCULAR; INTRAVENOUS EVERY 6 HOURS PRN
Status: DISCONTINUED | OUTPATIENT
Start: 2021-06-28 | End: 2021-06-30 | Stop reason: HOSPADM

## 2021-06-28 RX ADMIN — SODIUM CHLORIDE, POTASSIUM CHLORIDE, SODIUM LACTATE AND CALCIUM CHLORIDE: 600; 310; 30; 20 INJECTION, SOLUTION INTRAVENOUS at 10:48

## 2021-06-28 RX ADMIN — FENTANYL CITRATE 15 MCG: 50 INJECTION, SOLUTION INTRAMUSCULAR; INTRAVENOUS at 11:00

## 2021-06-28 RX ADMIN — BUPIVACAINE 20 ML: 13.3 INJECTION, SUSPENSION, LIPOSOMAL INFILTRATION at 12:29

## 2021-06-28 RX ADMIN — BUPIVACAINE HYDROCHLORIDE 20 ML: 2.5 INJECTION, SOLUTION EPIDURAL; INFILTRATION; INTRACAUDAL at 12:29

## 2021-06-28 RX ADMIN — MORPHINE SULFATE 0.15 MG: 1 INJECTION EPIDURAL; INTRATHECAL; INTRAVENOUS at 11:00

## 2021-06-28 RX ADMIN — SODIUM CITRATE AND CITRIC ACID MONOHYDRATE 30 ML: 500; 334 SOLUTION ORAL at 10:43

## 2021-06-28 RX ADMIN — KETOROLAC TROMETHAMINE 30 MG: 30 INJECTION, SOLUTION INTRAMUSCULAR; INTRAVENOUS at 20:12

## 2021-06-28 RX ADMIN — Medication 50 MCG/MIN: at 11:00

## 2021-06-28 RX ADMIN — OXYTOCIN-SODIUM CHLORIDE 0.9% IV SOLN 30 UNIT/500ML 300 ML/HR: 30-0.9/5 SOLUTION at 11:27

## 2021-06-28 RX ADMIN — KETOROLAC TROMETHAMINE 30 MG: 30 INJECTION, SOLUTION INTRAMUSCULAR; INTRAVENOUS at 13:51

## 2021-06-28 RX ADMIN — DOCUSATE SODIUM AND SENNOSIDES 2 TABLET: 8.6; 5 TABLET ORAL at 20:12

## 2021-06-28 RX ADMIN — ONDANSETRON 4 MG: 2 INJECTION INTRAMUSCULAR; INTRAVENOUS at 11:07

## 2021-06-28 RX ADMIN — ACETAMINOPHEN 975 MG: 325 TABLET, FILM COATED ORAL at 21:29

## 2021-06-28 RX ADMIN — Medication 2 G: at 11:02

## 2021-06-28 RX ADMIN — ACETAMINOPHEN 975 MG: 325 TABLET, FILM COATED ORAL at 15:40

## 2021-06-28 RX ADMIN — SODIUM CHLORIDE, POTASSIUM CHLORIDE, SODIUM LACTATE AND CALCIUM CHLORIDE: 600; 310; 30; 20 INJECTION, SOLUTION INTRAVENOUS at 10:01

## 2021-06-28 RX ADMIN — SODIUM CHLORIDE, POTASSIUM CHLORIDE, SODIUM LACTATE AND CALCIUM CHLORIDE: 600; 310; 30; 20 INJECTION, SOLUTION INTRAVENOUS at 12:10

## 2021-06-28 RX ADMIN — MEPIVACAINE HYDROCHLORIDE 1.6 ML: 20 INJECTION, SOLUTION EPIDURAL; INFILTRATION at 11:00

## 2021-06-28 RX ADMIN — SODIUM CHLORIDE, SODIUM LACTATE, POTASSIUM CHLORIDE, CALCIUM CHLORIDE AND DEXTROSE MONOHYDRATE: 5; 600; 310; 30; 20 INJECTION, SOLUTION INTRAVENOUS at 19:16

## 2021-06-28 RX ADMIN — Medication 100 ML/HR: at 14:43

## 2021-06-28 NOTE — ANESTHESIA CARE TRANSFER NOTE
Patient: Maya Pedro    Procedure(s):   section, tubal ligation, combined    Diagnosis: Previous  delivery, antepartum condition or complication [O34.219]  Diagnosis Additional Information: No value filed.    Anesthesia Type:   Spinal     Note:    Oropharynx: spontaneously breathing  Level of Consciousness: awake  Oxygen Supplementation: room air    Independent Airway: airway patency satisfactory and stable  Dentition: dentition unchanged  Vital Signs Stable: post-procedure vital signs reviewed and stable  Report to RN Given: handoff report given  Patient transferred to: PACU  Comments: Patient received TAP block in OR. Transferred to PACU on RA. VSS. Pt denies pain or nausea. Handoff provided to PACU RN.    Dominic Tong MD CA-1   Department of Anesthesiology  [phone]    Handoff Report: Identifed the Patient, Identified the Reponsible Provider, Reviewed the pertinent medical history, Discussed the surgical course, Reviewed Intra-OP anesthesia mangement and issues during anesthesia, Set expectations for post-procedure period and Allowed opportunity for questions and acknowledgement of understanding      Vitals: (Last set prior to Anesthesia Care Transfer)  CRNA VITALS  2021 1103 - 2021 1133      2021             EKG:  NSR        Electronically Signed By: Dominic Tong MD  2021  11:33 AM

## 2021-06-28 NOTE — ANESTHESIA PROCEDURE NOTES
Lumbar puncture Procedure Note  Pre-Procedure   Staff -        Anesthesiologist:  Damir Wen MD       Resident/Fellow: Dominic Tong MD       Performed By: resident       Location: OR       Procedure Start/Stop Times: 6/28/2021 10:55 AM and 6/28/2021 11:00 AM       Pre-Anesthestic Checklist: patient identified, IV checked, risks and benefits discussed, informed consent, monitors and equipment checked, pre-op evaluation, at physician/surgeon's request and post-op pain management  Timeout:       Correct Patient: Yes        Correct Procedure: Yes        Correct Site: Yes        Correct Position: Yes   Procedure Documentation  Procedure: lumbar puncture       Insertion Site: L3-4. (midline approach).       Needle Gauge: 25.        Needle Length (Inches): 3.5        Introducer used       Introducer: 20 G       # of attempts: 1 and  # of redirects:  2    Assessment/Narrative         Paresthesias: No.       Sensory Level: T5       CSF fluid: clear.    Medication(s) Administered   2% Mepivacaine PF (Intrathecal), 1.6 mL  Fentanyl PF (Intrathecal), 15 mcg  Morphine PF 1 mg/mL (Intrathecal), 0.15 mg  Medication Administration Time: 6/28/2021 11:00 AM

## 2021-06-28 NOTE — OP NOTE
[facility]  Brief Op Note     Surgery Date:  2021    Surgeon:           Alta Shearer MD     Assistants:       Ofelia Boudreaux MD, PGY-2     Pre-op Diagnosis:           - Intrauterine pregnancy at 39w0d  - previous  section, desire repeat  - Satisfied parity      Post-op Diagnosis:         - Same, now delivered and s/p procedures below     Procedure:        Repeat low-transverse  section with two layer uterine closure via Pfannenstiel incision                            Bilateral salpingectomy    Anesthesia:      Spinal, TAP block  EBL:     164 mL  IVF:       1000 mL crystalloid  UOP:    250 mL clear urine at the end of the case  Drains: Hollis Catheter   Specimens:      Bilateral fallopian tubes    Indications:   Maya Pedro is a 35 year old  at 39w0d admitted for scheduled repeat CS and bilateral tubal sterilization.  The risks, benefits, and alternatives of  section and bilateral tubal sterilization were discussed with the patient, and she agreed to proceed.     Findings:   - Minimal rectofascial adhesions, no intraabdominal adhesions  - Clear amniotic fluid  - Liveborn female infant in cephalic presentation. Apgars 9 at 1 minute & 9 at 5 minutes. Weight pending.  - Normal uterus, fallopian tubes, and ovaries.   - Hemostatic fallopian tube pedicles and mesosalpinx.     Complications: None apparent    Procedure Details:   The patient was brought to the OR, where adequate spinal anesthesia was administered.  She was placed in the dorsal supine position with a slight leftward tilt. She was prepped and draped in the usual sterile fashion. A surgical time out was performed. A pfannenstiel skin incision was made following her previous scar with the scalpel, and carried down to the underlying fascia with sharp and blunt dissection. The fascia was incised in the midline, and the incision was extended laterally with the Soler scissors. The superior  aspect of the fascia was grasped with the Kocher clamps and dissected off of the underlying rectus muscles with blunt and sharp dissection. Attention was then turned to the inferior aspect of the fascia, which was similarly dissected off of the underlying rectus muscles. The rectus muscles were  in the midline, and the peritoneum was entered bluntly, and the opening was extended with good visualization of the bladder. The bladder blade was placed. The vesicouterine peritoneum was incised in the midline, and the incision was extended laterally with the Metzenbaum scissors. A bladder flap was created and the bladder blade was replaced. A transverse hysterotomy was made with the scalpel in the lower uterine segment, and the incision was extended with digital pressure. The infant was noted to be in the cephalic presentation, and was delivered atraumatically. The shoulders delivered easily.  No nuchal cord was noted. The cord was doubly clamped and cut after a 60 second delay, and the infant was handed off to the awaiting nursing staff. A segment of cord was cut and held. The placenta was delivered with gentle traction on the umbilical cord and uterine massage. The uterus was exteriorized and cleared of all clots and debris. Uterine tone was noted to be firm with pitocin given through the running IV and uterine massage.  The hysterotomy was closed with a running locked suture of 0 Vicryl.  The hysterotomy was then imbricated using an 0 Vicryl suture. The hysterotomy was noted to be hemostatic. The right fallopian tube was identified and followed to the fimbriated end. A handheld Ligasure was used to ligate the fallopian tube in a stepwise fashion to a point <1 cm from the right cornua, at which point the fallopian tube was excised. The same procedure was repeated on the left in an identical fashion. The posterior cul-de-sac was cleared of all clots and debris. The uterus was returned to the abdomen. The  pericolic gutters were cleared of all clots and debris. The hysterotomy was reexamined and noted to be hemostatic. The fascia and rectus muscles were examined and areas of oozing were controlled with electrocautery. The fascia was closed with a running 0 Vicryl suture. The subcutaneous tissue was irrigated and areas of oozing were controlled with electrocautery. The subcutaneous tissue was closed with horizontal mattress sutures of 3-0 vicryl. The skin was closed with 4-0 monocryl and covered with Exofin.    All sponge, needle, and instrument counts were correct. The patient tolerated the procedure well, and was transferred to recovery in stable condition. Dr. Shearer was present and scrubbed for the entirety of the procedure.     Ofelia Boudreaux MD  OB/GYN, PGY-2  06/28/2021, 12:19 PM     I was scrubbed and present for the entire procedure.  I have reviewed and edited the above note.    Alta Shearer MD, FACOG

## 2021-06-28 NOTE — PROVIDER NOTIFICATION
06/28/21 1233   Provider Notification   Provider Name/Title Dr. Tong   Method of Notification At Bedside   Request Evaluate in Person   Notification Reason Vital Signs Change   Data: Pt to OB PACU at 1232 via cart. PIV infusing without complications, joshua with clear yellow urine to gravity, pt denies any pain, denies any nausea. BP elevated and bradycardia present, after pt received Phenylephrine from Anesthesia prior to leaving OR, accompanied by Dr. Tong. MD aware of VS.  Interventions: IV to pump, monitors and alarms on, SCD on.  Response: stable.  Plan: Patient instructed to notify RN for pain or nausea, routine post op cares, initiate breastfeeding as soon as patient/infant able. Received report from Dr. Tong at bedside in PACU.

## 2021-06-28 NOTE — PROVIDER NOTIFICATION
21 1035   Provider Notification   Provider Name/Title Dr. Shearer and Dr. Tong   Method of Notification At Bedside   Request Evaluate in Person   Notification Reason Patient Arrived     Data: Patient admitted to room triage 3 at 0912. Patient is a . Prenatal record reviewed.   OB History    Para Term  AB Living   3 2 2 0 1 2   SAB TAB Ectopic Multiple Live Births   0 0 0 0 2      # Outcome Date GA Lbr Dany/2nd Weight Sex Delivery Anes PTL Lv   3 Term 21 39w0d  2.83 kg (6 lb 3.8 oz) F CS-LTranv Spinal N RADHA      Name: TRISTAFEMALE-JADEN      Apgar1: 9  Apgar5: 9   2 Term 10/13/18 38w0d  2.26 kg (4 lb 15.7 oz) M CS-LTranv   LIVE BIRTH      Complications: Fetal Intolerance      Name: MATT WESTON JADEN      Apgar1: 7  Apgar5: 9   1 AB            .  Medical History: History reviewed. No pertinent past medical history..  Gestational age 39w0d. Vital signs per doc flowsheet. Fetal movement present. Patient reports Scheduled  Section   as reason for admission. Support person Sergei present.  Action: Verbal consent for EFM, external fetal monitors applied. Admission assessment completed. Patient and support persons educated on preoperative process. Patient instructed to report change in fetal movement, contractions, vaginal leaking of fluid or bleeding, abdominal pain, or any concerns related to the pregnancy to her nurse/physician. Patient oriented to room, call light in reach.   Response: Dr. Shearer and Anesthesia Resident at bedside.  Patient verbalized understanding of education and verbalized agreement with plan. Patient denies pain currently..

## 2021-06-28 NOTE — PLAN OF CARE
Data: Maya Pedro transferred to 7120 via wheelchair at 1440. Baby transferred via parent's arms.  Action: Receiving unit notified of transfer: Yes. Patient and family notified of room change. Report given to Nani Pinto RN at 1505. Belongings sent to receiving unit. Accompanied by Registered Nurse. Oriented patient to surroundings. Call light within reach. ID bands double-checked with receiving RN.  Response: Patient tolerated transfer and is stable.

## 2021-06-28 NOTE — BRIEF OP NOTE
[facility]  Brief Op Note     Surgery Date:  2021    Surgeon:  Alta Shearer MD    Assistants:  Ofelia Boudreaux MD, PGY-2    Pre-op Diagnosis:    - Intrauterine pregnancy at 39w0d  - H/o CS x1  - Satisfied parity     Post-op Diagnosis:    - Same, now delivered and s/p procedures below    Procedure:  Repeat low-transverse  section with two layer uterine closure via Pfannenstiel incision    Bilateral salpingectomy    Anesthesia: Spinal, TAP block  EBL:  164 mL  IVF:  1000 mL crystalloid  UOP:  250 mL clear urine at the end of the case  Drains: Hollis Catheter   Specimens:  Bilateral fallopian tubes    Findings:   - Minimal rectofascial adhesions, no intraabdominal adhesions  - Clear amniotic fluid  - Liveborn female infant in cephalic presentation. Apgars 9 at 1 minute & 9 at 5 minutes. Weight pending.  - Normal uterus, fallopian tubes, and ovaries.   - Hemostatic fallopian tube pedicles and mesosalpinx.    Complications: None apparent    Ofelia Boudreaux MD  OB/GYN, PGY-2  2021, 12:17 PM

## 2021-06-28 NOTE — ANESTHESIA POSTPROCEDURE EVALUATION
Patient: Maya Pedro    Procedure(s):   section, tubal ligation, combined    Diagnosis:Previous  delivery, antepartum condition or complication [O34.219]  Diagnosis Additional Information: No value filed.    Anesthesia Type:  Spinal    Note:  Disposition: Admission   Postop Pain Control: Uneventful            Sign Out: Well controlled pain   PONV: No   Neuro/Psych: Uneventful            Sign Out: Acceptable/Baseline neuro status   Airway/Respiratory: Uneventful            Sign Out: Acceptable/Baseline resp. status   CV/Hemodynamics: Uneventful            Sign Out: Acceptable CV status; No obvious hypovolemia; No obvious fluid overload   Other NRE: NONE   DID A NON-ROUTINE EVENT OCCUR? No    Event details/Postop Comments:  Bilateral TAP blocks performed with liposomal bupivacaine. Recovering as expected from the effects of neuraxial anesthesia. Okay for discharge from PACU.            Last vitals:  There were no vitals filed for this visit.    Last vitals prior to Anesthesia Care Transfer:  CRNA VITALS  2021 1200 - 2021 1234      2021             Pulse:  53    SpO2:  100 %          Electronically Signed By: Damir Meneses MD  2021  12:34 PM

## 2021-06-28 NOTE — ANESTHESIA PROCEDURE NOTES
TAP Procedure Note  Pre-Procedure   Staff -        Anesthesiologist:  Damir Wen MD       Resident/Fellow: Dominic Tong MD       Performed By: resident       Location: pre-op       Pre-Anesthestic Checklist: patient identified, IV checked, site marked, risks and benefits discussed, informed consent, monitors and equipment checked, pre-op evaluation, at physician/surgeon's request and post-op pain management  Timeout:       Correct Patient: Yes        Correct Procedure: Yes        Correct Site: Yes        Correct Position: Yes        Correct Laterality: N/A        Site Marked: N/A  Procedure Documentation  Procedure: TAP       Diagnosis: POST OPERATIVE PAIN       Laterality: bilateral       Patient Position: supine       Patient Prep/Sterile Barriers: sterile gloves, mask       Skin prep: Chloraprep       Needle Type: short bevel       Needle Gauge: 21.        Needle Length (millimeters): 110        Ultrasound guided       1. Ultrasound was used to identify targeted nerve, plexus, vascular marker, or fascial plane and place a needle adjacent to it in real-time.       2. Ultrasound was used to visualize the spread of anesthetic in close proximity to the above referenced structure.       3. A permanent image is entered into the patient's record.    Assessment/Narrative         The placement was negative for: blood aspirated, painful injection and site bleeding       Paresthesias: No.     Bolus given via needle..        Secured via.        Insertion/Infusion Method: Single Shot       Complications: none       Injection made incrementally with aspirations every 5 mL.    Medication(s) Administered   Bupivacaine 0.25% PF (Infiltration), 20 mL  Bupivacaine liposome (Exparel) 1.3% LA inj susp (Infiltration), 20 mL

## 2021-06-28 NOTE — PLAN OF CARE
Patient arrived to [facility] unit via zoom cart at 1520 ,with belongings, accompanied by spouse/ significant other, with infant in arms. Received report from  Zhao REYNA RN  and checked bands. Unit and room orientation started. Call light within arms reach; no concerns present at this time. Continue with plan of care.

## 2021-06-29 LAB
ALT SERPL W P-5'-P-CCNC: 16 U/L (ref 0–50)
AST SERPL W P-5'-P-CCNC: 24 U/L (ref 0–45)
CREAT SERPL-MCNC: 0.64 MG/DL (ref 0.52–1.04)
GFR SERPL CREATININE-BSD FRML MDRD: >90 ML/MIN/{1.73_M2}
HGB BLD-MCNC: 10.2 G/DL (ref 11.7–15.7)
PLATELET # BLD AUTO: 191 10E9/L (ref 150–450)

## 2021-06-29 PROCEDURE — 84460 ALANINE AMINO (ALT) (SGPT): CPT | Performed by: STUDENT IN AN ORGANIZED HEALTH CARE EDUCATION/TRAINING PROGRAM

## 2021-06-29 PROCEDURE — 85018 HEMOGLOBIN: CPT | Performed by: STUDENT IN AN ORGANIZED HEALTH CARE EDUCATION/TRAINING PROGRAM

## 2021-06-29 PROCEDURE — 250N000011 HC RX IP 250 OP 636: Performed by: STUDENT IN AN ORGANIZED HEALTH CARE EDUCATION/TRAINING PROGRAM

## 2021-06-29 PROCEDURE — 250N000013 HC RX MED GY IP 250 OP 250 PS 637: Performed by: STUDENT IN AN ORGANIZED HEALTH CARE EDUCATION/TRAINING PROGRAM

## 2021-06-29 PROCEDURE — 36415 COLL VENOUS BLD VENIPUNCTURE: CPT | Performed by: STUDENT IN AN ORGANIZED HEALTH CARE EDUCATION/TRAINING PROGRAM

## 2021-06-29 PROCEDURE — 120N000002 HC R&B MED SURG/OB UMMC

## 2021-06-29 PROCEDURE — 82565 ASSAY OF CREATININE: CPT | Performed by: STUDENT IN AN ORGANIZED HEALTH CARE EDUCATION/TRAINING PROGRAM

## 2021-06-29 PROCEDURE — 84450 TRANSFERASE (AST) (SGOT): CPT | Performed by: STUDENT IN AN ORGANIZED HEALTH CARE EDUCATION/TRAINING PROGRAM

## 2021-06-29 PROCEDURE — 85049 AUTOMATED PLATELET COUNT: CPT | Performed by: STUDENT IN AN ORGANIZED HEALTH CARE EDUCATION/TRAINING PROGRAM

## 2021-06-29 RX ORDER — IBUPROFEN 800 MG/1
800 TABLET, FILM COATED ORAL EVERY 6 HOURS
Status: DISCONTINUED | OUTPATIENT
Start: 2021-06-29 | End: 2021-06-30 | Stop reason: HOSPADM

## 2021-06-29 RX ADMIN — IBUPROFEN 800 MG: 800 TABLET, FILM COATED ORAL at 20:36

## 2021-06-29 RX ADMIN — KETOROLAC TROMETHAMINE 30 MG: 30 INJECTION, SOLUTION INTRAMUSCULAR; INTRAVENOUS at 01:53

## 2021-06-29 RX ADMIN — ACETAMINOPHEN 975 MG: 325 TABLET, FILM COATED ORAL at 22:29

## 2021-06-29 RX ADMIN — DOCUSATE SODIUM AND SENNOSIDES 2 TABLET: 8.6; 5 TABLET ORAL at 20:36

## 2021-06-29 RX ADMIN — ACETAMINOPHEN 975 MG: 325 TABLET, FILM COATED ORAL at 09:51

## 2021-06-29 RX ADMIN — ACETAMINOPHEN 975 MG: 325 TABLET, FILM COATED ORAL at 03:32

## 2021-06-29 RX ADMIN — IBUPROFEN 800 MG: 800 TABLET, FILM COATED ORAL at 14:33

## 2021-06-29 RX ADMIN — DOCUSATE SODIUM AND SENNOSIDES 2 TABLET: 8.6; 5 TABLET ORAL at 07:54

## 2021-06-29 RX ADMIN — SIMETHICONE 80 MG: 80 TABLET, CHEWABLE ORAL at 20:35

## 2021-06-29 RX ADMIN — ACETAMINOPHEN 975 MG: 325 TABLET, FILM COATED ORAL at 16:31

## 2021-06-29 RX ADMIN — IBUPROFEN 800 MG: 800 TABLET, FILM COATED ORAL at 08:05

## 2021-06-29 NOTE — PLAN OF CARE
Vital signs and assessment findings within normal. Pt bleeding scant, fundus firm. Incision open to air with liquid bandage. Pt took a shower at start of shift. Pain managed with tylenol and ibuprofen. Simethicone used for gas pain. Pt did describe pain increasing today but would like to avoid oxycodone. Pt educated on use and safety and aware of it's availability if needed. Up ad amauri, tolerating regular diet. Voiding and passing gas, has had BM. Breastfeeding well and independently. Bonding well with  and attentive to her care. Supported by spouse at bedside. Continue to monitor.

## 2021-06-29 NOTE — PROVIDER NOTIFICATION
06/29/21 0741   Provider Notification   Provider Name/Title Dr Boudreaux, G2   Method of Notification Electronic Page   Request Evaluate-Remote   Notification Reason Medication Request   Can Pt have Order of IBUPROFEN, Toradol IV doses completed.

## 2021-06-29 NOTE — PROGRESS NOTES
OB/GYN Postpartum Note    S: Maya Pedro is feeling overall good this morning. Pain is well controlled, mostly painful with ambulation. Very tired, didn't sleep much as baby was fussy with feeding. She ate yesterday without issue, is voiding spontaneously. Bleeding stable. Passing gas - started this morning. Breastfeeding.     Denies chest pain, shortness of breath, headaches, visual changes, swelling.       O:   Vitals:    21 1900 21 2000 21 2326 21 0328   BP: 130/84 133/78 (!) 143/83 109/59   Pulse: 54 56 57 56   Resp: 16 16 16 16   Temp: 98.4  F (36.9  C) 99  F (37.2  C) 99.3  F (37.4  C) 98.6  F (37  C)   TempSrc: Oral Oral Oral Oral   SpO2: 99% 100% 100% 97%     General: Laying in bed with infant, in NAD  CV: regular rate  Resp: nonlabored breathing  Abdomen: soft, nontender, non distended. Fundus firm at 2cm below umbilicus  Incision: Dressing in place, no surrounding erythema or induration  Extremities: Trace edema in BLE    UOP 900cc since midnight      A: Ms. Maya Pedro is a 35 year old  POD#1 s/p RLTCS with bilateral salpingectomy. She is recovering appropriately postpartum.     Plan:    #Postoperative Care  Heme: 10.9 >  > AM pending  Pain: Tylenol, toradol 24 hours > ibuprofen, oxycodone prn   : S/p joshua, voiding spontaneously   GI: Bowel regimen scheduled   Feeding: Breast  Contraception: S/p BS  PNC: Rh pos, rubella immune    #GHTN  - HELLP wnl, asymptomatic   - BP overnight majority normal range  - Discussed 1 week BP check  - UOP adequate      Disposition: When meeting post operative goals POD#2-3     Naomie Richey MD PGY2  Obstetrics & Gynecology  21     Appreciate Dr. Richey's note above, patient also seen and examined by me. I agree with the note above.   Yolanda De Souza MD

## 2021-06-29 NOTE — PLAN OF CARE
Data: Vital signs within normal limits. Postpartum checks within normal limits - see flow record. Patient eating and drinking normally. Patient able to empty bladder independently and is up ambulating. No apparent signs of infection. Incision healing well. Patient performing self cares and is able to care for infant.  Action: Patient medicated during the shift for pain. See MAR. Patient reassessed within 1 hour after each medication and pain was improved - patient stated she was comfortable.   Response: Positive attachment behaviors observed with infant. Support persons FOB present.   Plan: Anticipate discharge on 6/30/2021.

## 2021-06-29 NOTE — PLAN OF CARE
Vitals & PP assessments stable. Lungs clear. Up ad amauri. Voidedx1 500ML at 2130 Intake adequate.  Breast feeding well & indep with feedings.   Positive bonding with baby observed.   Continue on supportive cares.

## 2021-06-29 NOTE — PLAN OF CARE
Data: Vital signs within normal limits. Postpartum checks within normal limits - see flow record. Patient eating and drinking normally. Patient able to empty bladder independently and is up ambulating. No apparent signs of infection. Patient performing self cares and is able to care for infant.  Action: Patient medicated during the shift for pain adequately with toradol and tylenol. See MAR. Patient reassessed within 1 hour after each medication and pain was improved - patient stated she was comfortable. Patient education done about safe sleep. See flow record.  Response: Positive attachment behaviors observed with infant. Support person, , present.

## 2021-06-29 NOTE — DISCHARGE SUMMARY
[facility]   Discharge Summary    Maya Pedro MRN# [id number]   Age: 35 year old YOB: 1985     Date of Admission:  2021  Date of Discharge::  2021  Admitting Physician:  Alta Shearer MD  Discharge Physician:  Hui Pardo MD             Admission Diagnoses:   Intrauterine pregnancy at 39w0d  - H/o CS x1 for Cat II FHT remote from delivery  - H/o volvulus correction surgery at 2 yo  - HSV          Discharge Diagnosis:     Same, delivered   - Gestational HTN          Procedures:     Procedure(s): Repeat low-transverse  section with two layer uterine closure via Pfannenstiel incision  Bilateral salpingectomy  Spinal anesthesia  TAP block                Medications Prior to Admission:     Medications Prior to Admission   Medication Sig Dispense Refill Last Dose     ACYCLOVIR PO Take 1 g by mouth daily    Past Month at Unknown time     Docusate Sodium (COLACE PO) Take 100 mg by mouth   Past Month at Unknown time     ferrous gluconate (FERGON) 324 (38 Fe) MG tablet Take 1 tablet (324 mg) by mouth daily (with breakfast) 60 tablet 0 2021 at Unknown time     Prenatal Vit-Fe Fumarate-FA (PRENATAL MULTIVITAMIN PLUS IRON) 27-0.8 MG TABS per tablet Take 1 tablet by mouth daily   2021 at Unknown time     VITAMIN D, CHOLECALCIFEROL, PO Take by mouth daily   Past Week at Unknown time     Ferrous Sulfate (IRON SUPPLEMENT PO) Take 325 mg by mouth daily    More than a month at Unknown time     ibuprofen (ADVIL/MOTRIN) 800 MG tablet Take 1 tablet (800 mg) by mouth every 6 hours as needed for other (cramping) (Patient not taking: Reported on 2021) 40 tablet 0 More than a month at Unknown time     oxyCODONE IR (ROXICODONE) 5 MG tablet Take 1 tablet (5 mg) by mouth every 6 hours as needed for severe pain (Patient not taking: Reported on 2021) 10 tablet 0 More than a month at Unknown time     senna-docusate (SENOKOT-S;PERICOLACE)  8.6-50 MG per tablet Take 1 tablet by mouth 2 times daily as needed for constipation (Patient not taking: Reported on 2021) 40 tablet 0 More than a month at Unknown time             Discharge Medications:        Review of your medicines      START taking      Dose / Directions   acetaminophen 325 MG tablet  Commonly known as: TYLENOL  Used for: S/P  section      Dose: 650 mg  Take 2 tablets (650 mg) by mouth every 6 hours as needed for mild pain Start after Delivery.  Quantity: 100 tablet  Refills: 0        CONTINUE these medicines which may have CHANGED, or have new prescriptions. If we are uncertain of the size of tablets/capsules you have at home, strength may be listed as something that might have changed.      Dose / Directions   * ibuprofen 800 MG tablet  Commonly known as: ADVIL/MOTRIN  This may have changed: Another medication with the same name was added. Make sure you understand how and when to take each.  Used for: S/P  section      Dose: 800 mg  Take 1 tablet (800 mg) by mouth every 6 hours as needed for other (cramping)  Quantity: 40 tablet  Refills: 0     * ibuprofen 600 MG tablet  Commonly known as: ADVIL/MOTRIN  This may have changed: You were already taking a medication with the same name, and this prescription was added. Make sure you understand how and when to take each.  Used for: S/P  section      Dose: 600 mg  Take 1 tablet (600 mg) by mouth every 6 hours as needed for moderate pain Start after delivery  Quantity: 60 tablet  Refills: 0     * senna-docusate 8.6-50 MG tablet  Commonly known as: SENOKOT-S/PERICOLACE  This may have changed: Another medication with the same name was added. Make sure you understand how and when to take each.  Used for: S/P  section      Dose: 1 tablet  Take 1 tablet by mouth 2 times daily as needed for constipation  Quantity: 40 tablet  Refills: 0     * senna-docusate 8.6-50 MG tablet  Commonly known as: SENOKOT-S/PERICOLACE  This  may have changed: You were already taking a medication with the same name, and this prescription was added. Make sure you understand how and when to take each.  Used for: S/P  section      Dose: 1 tablet  Take 1 tablet by mouth daily Start after delivery.  Quantity: 100 tablet  Refills: 0         * This list has 4 medication(s) that are the same as other medications prescribed for you. Read the directions carefully, and ask your doctor or other care provider to review them with you.            CONTINUE these medicines which have NOT CHANGED      Dose / Directions   COLACE PO      Dose: 100 mg  Take 100 mg by mouth  Refills: 0     ferrous gluconate 324 (38 Fe) MG tablet  Commonly known as: FERGON  Used for: S/P  section      Dose: 324 mg  Take 1 tablet (324 mg) by mouth daily (with breakfast)  Quantity: 60 tablet  Refills: 0     IRON SUPPLEMENT PO      Dose: 325 mg  Take 325 mg by mouth daily  Refills: 0     oxyCODONE 5 MG tablet  Commonly known as: ROXICODONE  Used for: S/P  section      Dose: 5 mg  Take 1 tablet (5 mg) by mouth every 6 hours as needed for severe pain  Quantity: 10 tablet  Refills: 0     prenatal multivitamin w/iron 27-0.8 MG tablet  Indication: Pregnancy      Dose: 1 tablet  Take 1 tablet by mouth daily  Refills: 0     VITAMIN D (CHOLECALCIFEROL) PO      Take by mouth daily  Refills: 0        STOP taking    ACYCLOVIR PO              Where to get your medicines      These medications were sent to [facility] [place], [place] - [address]  [address], [facility] [address]    Phone: [phone]     acetaminophen 325 MG tablet    ibuprofen 600 MG tablet    senna-docusate 8.6-50 MG tablet                 Consultations:   Anesthesia           Brief Admission History:   Ms. Maya Pedro is a 35 year old  who initially presented at 39w0d for scheduled repeat  and salpingectomy.       Intraoperative course   The procedure was  uncomplicated.   mL.  See operative report for details.     Operative findings:   - Minimal rectofascial adhesions, no intraabdominal adhesions  - Clear amniotic fluid  - Liveborn female infant in cephalic presentation. Apgars 9 at 1 minute & 9 at 5 minutes. Weight pending.  - Normal uterus, fallopian tubes, and ovaries.   - Hemostatic fallopian tube pedicles and mesosalpinx.       Postpartum Course   On POD#1 the patient met criteria for gestational hypertension. She was asymptomatic. HELLP labs were normal. Her blood pressure remained stable without medications.     She recovered as anticipated and experienced no post-operative complications.  On discharge, her pain was well controlled. Vaginal bleeding is similar to peak menstrual flow.  Voiding without difficulty.  Ambulating well, tolerating a normal diet, and has resumption of bowel function.  No fever or significant wound drainage.  Breastfeeding well.  Infant is stable. She was discharged on post-partum day #2.    Post-partum hemoglobin:   Hemoglobin   Date Value Ref Range Status   06/28/2021 10.9 (L) 11.7 - 15.7 g/dL Final     Contraception: S/p BS  Rh positive, no Rhogam indicated  Rubella immune, no MMR indicated          Discharge Instructions and Follow-Up:     Discharge diet: Regular   Discharge activity: No lifting greater than 20 lbs, pushing, pulling, or other strenuous activity for 6 weeks. Pelvic rest for 6 weeks including no sexual intercourse, tampons, or douching. No driving until you can slam on the breaks without pain or while on narcotic pain medications.    Discharge follow-up: Follow up with primary OB for routine postpartum visit in 6 weeks  Follow up in 1 week for BP check   Wound care: Keep incision clean and dry           Discharge Disposition:     Discharged to home          Naomie Richey MD PGY3  Obstetrics & Gynecology  06/30/21     [facility] staff:  Appreciate note by Dr. Richey.  I have seen and examined  the patient without the resident. I have reviewed, edited, and agree with the note.      Hui Pardo MD, FACOG  6/30/2021  1:52 PM

## 2021-06-29 NOTE — PROGRESS NOTES
Post  Anesthesia Follow Up Note    Patient: Maya Pedro    Patient location: Postpartum floor.    Chief complaint: Acute postoperative pain management s/p intrathecal morphine administration     Procedure(s) Performed:  Procedure(s):   section, tubal ligation, combined    Anesthesia type: Spinal Block    Subjective:     Pain Control: 4/10 at rest and 5/10 with ambulation    Additional ROS:  She does not complain of pruritis at this time. She denies weakness, denies paresthesia, denies difficulties breathing or voiding, denies nausea or vomiting. She is able to ambulate and tolerates regular diet.    Objective:    Respiratory Function (RR / SpO2 / Airway Patency): Satisfactory    Cardiac Function (HR / Rhythm / BP): Satisfactory    Strength and sensation lower extremities: Normal    Site of spinal/epidural insertion: No signs of infection or inflammation.     Last Vitals: /68   Pulse 60   Temp 36.9  C (98.4  F) (Oral)   Resp 16   LMP 10/05/2020   SpO2 99%   Breastfeeding Unknown     Assessment and plan:   Maya Pedro is a 35 year old female  POD #1 s/p   with IT bupivacaine (1.6ml), fentanyl (15mcg) and morphine (150mcg); and single shot TAP nerve block injections with 20 mL bupivacaine 0.25% and 20mL liposome bupivacaine (Exparel) long-acting 1.3% given in the PACU for postoperative analgesia.  Pt is ambulating without difficulty, no weakness or paresthesias.  There is no evidence of adverse side effects associated with spinal and nerve block injections.  The patient is receiving adequate incisional pain control at this time and anticipate up to 72 hours of incisional pain control.  However, we further anticipate that the patient will require opioid/nonopioid analgesics for visceral and muscle pain that is not controlled with local anesthetic.      In brief summary, her post-operative analgesia is adequate today. Further interventional analgesic strategies  would be of little utility at this time. Thus, we recommend proceeding with PO analgesics including staggered dosing of NSAIDs (ibuprofen) and acetaminophen, with a taper of oxycodone.     Thank you for including us in the care for this patient.    Dominic Tong MD CA-1   Department of Anesthesiology  [phone]

## 2021-06-30 VITALS
HEART RATE: 67 BPM | TEMPERATURE: 99.2 F | RESPIRATION RATE: 16 BRPM | DIASTOLIC BLOOD PRESSURE: 83 MMHG | OXYGEN SATURATION: 99 % | SYSTOLIC BLOOD PRESSURE: 132 MMHG

## 2021-06-30 PROCEDURE — 250N000013 HC RX MED GY IP 250 OP 250 PS 637: Performed by: STUDENT IN AN ORGANIZED HEALTH CARE EDUCATION/TRAINING PROGRAM

## 2021-06-30 RX ORDER — AMOXICILLIN 250 MG
1 CAPSULE ORAL DAILY
Qty: 100 TABLET | Refills: 0 | Status: SHIPPED | OUTPATIENT
Start: 2021-06-30

## 2021-06-30 RX ORDER — ACETAMINOPHEN 325 MG/1
650 TABLET ORAL EVERY 6 HOURS PRN
Qty: 100 TABLET | Refills: 0 | Status: SHIPPED | OUTPATIENT
Start: 2021-06-30

## 2021-06-30 RX ORDER — IBUPROFEN 600 MG/1
600 TABLET, FILM COATED ORAL EVERY 6 HOURS PRN
Qty: 60 TABLET | Refills: 0 | Status: SHIPPED | OUTPATIENT
Start: 2021-06-30

## 2021-06-30 RX ORDER — SIMETHICONE 80 MG
80 TABLET,CHEWABLE ORAL EVERY 6 HOURS PRN
Qty: 40 TABLET | Refills: 0 | Status: SHIPPED | OUTPATIENT
Start: 2021-06-30

## 2021-06-30 RX ADMIN — SIMETHICONE 80 MG: 80 TABLET, CHEWABLE ORAL at 02:28

## 2021-06-30 RX ADMIN — ACETAMINOPHEN 975 MG: 325 TABLET, FILM COATED ORAL at 10:38

## 2021-06-30 RX ADMIN — IBUPROFEN 800 MG: 800 TABLET, FILM COATED ORAL at 08:21

## 2021-06-30 RX ADMIN — ACETAMINOPHEN 975 MG: 325 TABLET, FILM COATED ORAL at 04:29

## 2021-06-30 RX ADMIN — IBUPROFEN 800 MG: 800 TABLET, FILM COATED ORAL at 02:28

## 2021-06-30 RX ADMIN — DOCUSATE SODIUM AND SENNOSIDES 2 TABLET: 8.6; 5 TABLET ORAL at 08:21

## 2021-06-30 NOTE — PLAN OF CARE
Data: Vital signs within normal limits. Postpartum checks within normal limits - see flow record. Patient eating and drinking normally. Patient able to empty bladder independently and is up ambulating. No apparent signs of infection. Incision healing well. Patient performing self cares and is able to care for infant.  Action: Patient medicated during the shift for pain. See MAR. Patient reassessed within 1 hour after each medication and pain was improved - patient stated she was comfortable. See flow record.  Response: Positive attachment behaviors observed with infant. Support person  present.

## 2021-06-30 NOTE — PLAN OF CARE
Patient is adequate for discharge. Will follow up in clinic for BP check in a few days and 6 week postpartum. Education complete and questions answered.

## 2021-06-30 NOTE — PROGRESS NOTES
OB/GYN Postpartum Note    S: Maya Pedro is feeling overall good this morning. Pain is well controlled, mostly painful with ambulation. Slept better overnight. She ate yesterday without issue, is voiding spontaneously. Bleeding stable. Passing gas, using simethicone which is helpful. Breastfeeding. Would like to discharge home today if possible.    Denies chest pain, shortness of breath, headaches, visual changes, swelling.       O:   Vitals:    21 0328 21 0748 21 1727 21 2340   BP: 109/59 120/68 116/71 132/65   Pulse: 56 60 67 61   Resp: 16 16 16 16   Temp: 98.6  F (37  C) 98.4  F (36.9  C) 98.4  F (36.9  C) 99.5  F (37.5  C)   TempSrc: Oral Oral Oral Oral   SpO2: 97% 99%       General: Laying in bed with infant, in NAD  CV: regular rate  Resp: nonlabored breathing  Abdomen: soft, nontender, non distended. Fundus firm at 2cm below umbilicus  Incision: C/d/i, no surrounding erythema or induration  Extremities: Trace edema in BLE      A: Ms. Maya Pedro is a 35 year old  POD#2 s/p RLTCS with bilateral salpingectomy. She is recovering appropriately postpartum. Stable for discharge after BP monitoring today.     Plan:  #Postoperative Care  Heme: 10.9 >  > 10.2, expected acute blood loss anemia  Pain: Tylenol, toradol 24 hours > ibuprofen, oxycodone prn   : S/p joshua, voiding spontaneously   GI: Bowel regimen scheduled   Feeding: Breast  Contraception: S/p BS  PNC: Rh pos, rubella immune    #GHTN  - HELLP wnl, asymptomatic   - BP overnight majority normal range  - Discussed 1 week BP check  - UOP adequate      Disposition: When meeting post operative goals POD#2 today- will monitor BPs until noon, if all normal, can discharge.     Naomie Richey MD PGY3  Obstetrics & Gynecology  21     [facility] staff:  Appreciate note by Dr. Richey.  I have seen and examined the patient without the resident. I have reviewed, edited, and agree with the  note.        Hui Pardo MD, FACOG  6/30/2021  8:40 AM

## 2021-07-01 LAB — COPATH REPORT: NORMAL

## (undated) DEVICE — SU MONOCRYL 4-0 PS-2 18" UND Y496G

## (undated) DEVICE — PREP CHLORAPREP 26ML TINTED ORANGE  260815

## (undated) DEVICE — CATH TRAY FOLEY 16FR BARDEX W/DRAIN BAG STATLOCK 300316A

## (undated) DEVICE — DRSG STERI STRIP 1/4X3" R1541

## (undated) DEVICE — PACK C-SECTION LF PL15OTA83B

## (undated) DEVICE — SU MONOCRYL 0 CT-1 36" Y346H

## (undated) DEVICE — CATH TRAY FOLEY 16FR SILICONE 907416

## (undated) DEVICE — GLOVE PROTEXIS BLUE W/NEU-THERA 7.0  2D73EB70

## (undated) DEVICE — SOL ADH LIQUID BENZOIN SWAB 0.6ML C1544

## (undated) DEVICE — GLOVE ESTEEM POWDER FREE SMT 6.5  2D72PT65

## (undated) DEVICE — DRSG STERI STRIP 1/2X4" R1547

## (undated) DEVICE — STRAP KNEE/BODY 31143004

## (undated) DEVICE — STOCKING SLEEVE COMPRESSION CALF LG

## (undated) DEVICE — SUCTION CANISTER MEDIVAC LINER 1500ML W/LID 65651-515

## (undated) DEVICE — SOL WATER IRRIG 1000ML BOTTLE 07139-09

## (undated) DEVICE — SOL NACL 0.9% IRRIG 1000ML BOTTLE 07138-09

## (undated) DEVICE — ESU LIGASURE OPEN SEALER/DIVIDER SM JAW 16.5MM LF1212A

## (undated) DEVICE — GLOVE PROTEXIS BLUE W/NEU-THERA 6.5  2D73EB65

## (undated) DEVICE — ESU GROUND PAD UNIVERSAL W/O CORD

## (undated) DEVICE — SU VICRYL 0 CT-1 36" J346H

## (undated) DEVICE — BASIN SET MAJOR

## (undated) DEVICE — SU VICRYL 3-0 CTX 36" UND J980H

## (undated) DEVICE — DRAPE SETUP C-SECTION INVISISHIELD 54X90" DYNJE5600

## (undated) RX ORDER — KETOROLAC TROMETHAMINE 30 MG/ML
INJECTION, SOLUTION INTRAMUSCULAR; INTRAVENOUS
Status: DISPENSED
Start: 2021-06-28

## (undated) RX ORDER — MORPHINE SULFATE 1 MG/ML
INJECTION, SOLUTION EPIDURAL; INTRATHECAL; INTRAVENOUS
Status: DISPENSED
Start: 2021-06-28

## (undated) RX ORDER — FENTANYL CITRATE 50 UG/ML
INJECTION, SOLUTION INTRAMUSCULAR; INTRAVENOUS
Status: DISPENSED
Start: 2021-06-28

## (undated) RX ORDER — FENTANYL CITRATE 50 UG/ML
INJECTION, SOLUTION INTRAMUSCULAR; INTRAVENOUS
Status: DISPENSED
Start: 2018-10-13

## (undated) RX ORDER — PHENYLEPHRINE HCL IN 0.9% NACL 1 MG/10 ML
SYRINGE (ML) INTRAVENOUS
Status: DISPENSED
Start: 2018-10-13

## (undated) RX ORDER — EPINEPHRINE 1 MG/ML
INJECTION, SOLUTION, CONCENTRATE INTRAVENOUS
Status: DISPENSED
Start: 2021-06-28

## (undated) RX ORDER — EPHEDRINE SULFATE 50 MG/ML
INJECTION, SOLUTION INTRAMUSCULAR; INTRAVENOUS; SUBCUTANEOUS
Status: DISPENSED
Start: 2021-06-28

## (undated) RX ORDER — OXYTOCIN/0.9 % SODIUM CHLORIDE 30/500 ML
PLASTIC BAG, INJECTION (ML) INTRAVENOUS
Status: DISPENSED
Start: 2021-06-28

## (undated) RX ORDER — ONDANSETRON 2 MG/ML
INJECTION INTRAMUSCULAR; INTRAVENOUS
Status: DISPENSED
Start: 2018-10-13

## (undated) RX ORDER — FENTANYL CITRATE-0.9 % NACL/PF 10 MCG/ML
PLASTIC BAG, INJECTION (ML) INTRAVENOUS
Status: DISPENSED
Start: 2021-06-28